# Patient Record
Sex: FEMALE | Race: ASIAN | URBAN - METROPOLITAN AREA
[De-identification: names, ages, dates, MRNs, and addresses within clinical notes are randomized per-mention and may not be internally consistent; named-entity substitution may affect disease eponyms.]

---

## 2017-06-30 ENCOUNTER — APPOINTMENT (OUTPATIENT)
Dept: CT IMAGING | Facility: CLINIC | Age: 42
End: 2017-06-30
Attending: EMERGENCY MEDICINE
Payer: COMMERCIAL

## 2017-06-30 ENCOUNTER — OFFICE VISIT (OUTPATIENT)
Dept: URGENT CARE | Facility: URGENT CARE | Age: 42
End: 2017-06-30
Payer: COMMERCIAL

## 2017-06-30 ENCOUNTER — APPOINTMENT (OUTPATIENT)
Dept: GENERAL RADIOLOGY | Facility: CLINIC | Age: 42
End: 2017-06-30
Attending: EMERGENCY MEDICINE
Payer: COMMERCIAL

## 2017-06-30 ENCOUNTER — HOSPITAL ENCOUNTER (EMERGENCY)
Facility: CLINIC | Age: 42
Discharge: HOME | End: 2017-06-30
Attending: EMERGENCY MEDICINE | Admitting: EMERGENCY MEDICINE
Payer: COMMERCIAL

## 2017-06-30 VITALS
WEIGHT: 99.6 LBS | TEMPERATURE: 101.9 F | HEART RATE: 120 BPM | DIASTOLIC BLOOD PRESSURE: 80 MMHG | SYSTOLIC BLOOD PRESSURE: 120 MMHG | BODY MASS INDEX: 20.46 KG/M2 | OXYGEN SATURATION: 98 %

## 2017-06-30 VITALS
WEIGHT: 101 LBS | HEART RATE: 94 BPM | HEIGHT: 55 IN | SYSTOLIC BLOOD PRESSURE: 118 MMHG | OXYGEN SATURATION: 99 % | DIASTOLIC BLOOD PRESSURE: 73 MMHG | TEMPERATURE: 98.9 F | BODY MASS INDEX: 23.37 KG/M2 | RESPIRATION RATE: 16 BRPM

## 2017-06-30 DIAGNOSIS — E86.0 DEHYDRATION: ICD-10-CM

## 2017-06-30 DIAGNOSIS — R50.9 FEVER, UNSPECIFIED: Primary | ICD-10-CM

## 2017-06-30 DIAGNOSIS — N12 PYELONEPHRITIS: ICD-10-CM

## 2017-06-30 DIAGNOSIS — R11.2 NAUSEA AND VOMITING, INTRACTABILITY OF VOMITING NOT SPECIFIED, UNSPECIFIED VOMITING TYPE: ICD-10-CM

## 2017-06-30 DIAGNOSIS — M54.50 ACUTE RIGHT-SIDED LOW BACK PAIN WITHOUT SCIATICA: ICD-10-CM

## 2017-06-30 DIAGNOSIS — N10 ACUTE PYELONEPHRITIS: ICD-10-CM

## 2017-06-30 LAB
ALBUMIN SERPL-MCNC: 3.5 G/DL (ref 3.4–5)
ALBUMIN UR-MCNC: 100 MG/DL
ALBUMIN UR-MCNC: >=300 MG/DL
ALP SERPL-CCNC: 101 U/L (ref 40–150)
ALT SERPL W P-5'-P-CCNC: 26 U/L (ref 0–50)
AMORPH CRY #/AREA URNS HPF: ABNORMAL /HPF
ANION GAP SERPL CALCULATED.3IONS-SCNC: 10 MMOL/L (ref 3–14)
APPEARANCE UR: ABNORMAL
APPEARANCE UR: ABNORMAL
AST SERPL W P-5'-P-CCNC: ABNORMAL U/L (ref 0–45)
BACTERIA #/AREA URNS HPF: ABNORMAL /HPF
BACTERIA #/AREA URNS HPF: ABNORMAL /HPF
BASOPHILS # BLD AUTO: 0 10E9/L (ref 0–0.2)
BASOPHILS NFR BLD AUTO: 0.1 %
BILIRUB SERPL-MCNC: 0.5 MG/DL (ref 0.2–1.3)
BILIRUB UR QL STRIP: NEGATIVE
BILIRUB UR QL STRIP: NEGATIVE
BUN SERPL-MCNC: 11 MG/DL (ref 7–30)
CALCIUM SERPL-MCNC: 9.2 MG/DL (ref 8.5–10.1)
CHLORIDE SERPL-SCNC: 96 MMOL/L (ref 94–109)
CO2 BLDCOV-SCNC: 24 MMOL/L (ref 21–28)
CO2 SERPL-SCNC: 24 MMOL/L (ref 20–32)
COLOR UR AUTO: YELLOW
COLOR UR AUTO: YELLOW
CREAT SERPL-MCNC: 0.78 MG/DL (ref 0.52–1.04)
CRP SERPL-MCNC: 236 MG/L (ref 0–8)
DIFFERENTIAL METHOD BLD: ABNORMAL
EOSINOPHIL # BLD AUTO: 0 10E9/L (ref 0–0.7)
EOSINOPHIL NFR BLD AUTO: 0 %
ERYTHROCYTE [DISTWIDTH] IN BLOOD BY AUTOMATED COUNT: 15.7 % (ref 10–15)
GFR SERPL CREATININE-BSD FRML MDRD: 81 ML/MIN/1.7M2
GLUCOSE SERPL-MCNC: 124 MG/DL (ref 70–99)
GLUCOSE UR STRIP-MCNC: NEGATIVE MG/DL
GLUCOSE UR STRIP-MCNC: NEGATIVE MG/DL
HCG UR QL: NEGATIVE
HCT VFR BLD AUTO: 39.6 % (ref 35–47)
HGB BLD-MCNC: 12.8 G/DL (ref 11.7–15.7)
HGB UR QL STRIP: ABNORMAL
HGB UR QL STRIP: ABNORMAL
KETONES UR STRIP-MCNC: >160 MG/DL
KETONES UR STRIP-MCNC: >=80 MG/DL
LACTATE BLD-SCNC: 0.9 MMOL/L (ref 0.7–2.1)
LEUKOCYTE ESTERASE UR QL STRIP: ABNORMAL
LEUKOCYTE ESTERASE UR QL STRIP: NEGATIVE
LYMPHOCYTES # BLD AUTO: 2.2 10E9/L (ref 0.8–5.3)
LYMPHOCYTES NFR BLD AUTO: 9.3 %
MCH RBC QN AUTO: 22.1 PG (ref 26.5–33)
MCHC RBC AUTO-ENTMCNC: 32.3 G/DL (ref 31.5–36.5)
MCV RBC AUTO: 68 FL (ref 78–100)
MICRO REPORT STATUS: NORMAL
MONOCYTES # BLD AUTO: 1.9 10E9/L (ref 0–1.3)
MONOCYTES NFR BLD AUTO: 7.9 %
MUCOUS THREADS #/AREA URNS LPF: PRESENT /LPF
NEUTROPHILS # BLD AUTO: 19.3 10E9/L (ref 1.6–8.3)
NEUTROPHILS NFR BLD AUTO: 82.7 %
NITRATE UR QL: POSITIVE
NITRATE UR QL: POSITIVE
NON-SQ EPI CELLS #/AREA URNS LPF: ABNORMAL /LPF
NON-SQ EPI CELLS #/AREA URNS LPF: ABNORMAL /LPF
PCO2 BLDV: 31 MM HG (ref 40–50)
PH BLDV: 7.5 PH (ref 7.32–7.43)
PH UR STRIP: 5.5 PH (ref 5–7)
PH UR STRIP: 7 PH (ref 5–7)
PLATELET # BLD AUTO: 231 10E9/L (ref 150–450)
PO2 BLDV: 72 MM HG (ref 25–47)
POTASSIUM SERPL-SCNC: 3.8 MMOL/L (ref 3.4–5.3)
PROCALCITONIN SERPL-MCNC: 1.61 NG/ML
PROT SERPL-MCNC: 9 G/DL (ref 6.8–8.8)
RBC # BLD AUTO: 5.8 10E12/L (ref 3.8–5.2)
RBC #/AREA URNS AUTO: ABNORMAL /HPF (ref 0–2)
RBC #/AREA URNS AUTO: ABNORMAL /HPF (ref 0–2)
SAO2 % BLDV FROM PO2: 96 %
SODIUM SERPL-SCNC: 130 MMOL/L (ref 133–144)
SP GR UR STRIP: 1.01 (ref 1–1.03)
SP GR UR STRIP: 1.02 (ref 1–1.03)
SPECIMEN SOURCE: NORMAL
URN SPEC COLLECT METH UR: ABNORMAL
URN SPEC COLLECT METH UR: ABNORMAL
UROBILINOGEN UR STRIP-ACNC: 0.2 EU/DL (ref 0.2–1)
UROBILINOGEN UR STRIP-ACNC: 1 EU/DL (ref 0.2–1)
WBC # BLD AUTO: 23.3 10E9/L (ref 4–11)
WBC #/AREA URNS AUTO: ABNORMAL /HPF (ref 0–2)
WBC #/AREA URNS AUTO: ABNORMAL /HPF (ref 0–2)
WET PREP SPEC: NORMAL

## 2017-06-30 PROCEDURE — 86140 C-REACTIVE PROTEIN: CPT | Performed by: EMERGENCY MEDICINE

## 2017-06-30 PROCEDURE — 87077 CULTURE AEROBIC IDENTIFY: CPT | Performed by: FAMILY MEDICINE

## 2017-06-30 PROCEDURE — 82803 BLOOD GASES ANY COMBINATION: CPT

## 2017-06-30 PROCEDURE — 74177 CT ABD & PELVIS W/CONTRAST: CPT

## 2017-06-30 PROCEDURE — 96372 THER/PROPH/DIAG INJ SC/IM: CPT | Performed by: FAMILY MEDICINE

## 2017-06-30 PROCEDURE — 87186 SC STD MICRODIL/AGAR DIL: CPT | Mod: 59 | Performed by: FAMILY MEDICINE

## 2017-06-30 PROCEDURE — 96360 HYDRATION IV INFUSION INIT: CPT | Mod: 59

## 2017-06-30 PROCEDURE — 87086 URINE CULTURE/COLONY COUNT: CPT | Performed by: FAMILY MEDICINE

## 2017-06-30 PROCEDURE — 87040 BLOOD CULTURE FOR BACTERIA: CPT | Performed by: FAMILY MEDICINE

## 2017-06-30 PROCEDURE — 83605 ASSAY OF LACTIC ACID: CPT

## 2017-06-30 PROCEDURE — 36415 COLL VENOUS BLD VENIPUNCTURE: CPT | Performed by: FAMILY MEDICINE

## 2017-06-30 PROCEDURE — 99214 OFFICE O/P EST MOD 30 MIN: CPT | Mod: 25 | Performed by: FAMILY MEDICINE

## 2017-06-30 PROCEDURE — 96361 HYDRATE IV INFUSION ADD-ON: CPT

## 2017-06-30 PROCEDURE — 87040 BLOOD CULTURE FOR BACTERIA: CPT | Performed by: EMERGENCY MEDICINE

## 2017-06-30 PROCEDURE — 84145 PROCALCITONIN (PCT): CPT | Performed by: EMERGENCY MEDICINE

## 2017-06-30 PROCEDURE — 85025 COMPLETE CBC W/AUTO DIFF WBC: CPT | Performed by: FAMILY MEDICINE

## 2017-06-30 PROCEDURE — 25000125 ZZHC RX 250: Performed by: EMERGENCY MEDICINE

## 2017-06-30 PROCEDURE — 25000128 H RX IP 250 OP 636: Performed by: EMERGENCY MEDICINE

## 2017-06-30 PROCEDURE — 71020 XR CHEST 2 VW: CPT

## 2017-06-30 PROCEDURE — 36415 COLL VENOUS BLD VENIPUNCTURE: CPT

## 2017-06-30 PROCEDURE — 81001 URINALYSIS AUTO W/SCOPE: CPT | Performed by: EMERGENCY MEDICINE

## 2017-06-30 PROCEDURE — 80053 COMPREHEN METABOLIC PANEL: CPT | Performed by: FAMILY MEDICINE

## 2017-06-30 PROCEDURE — 25000132 ZZH RX MED GY IP 250 OP 250 PS 637: Performed by: EMERGENCY MEDICINE

## 2017-06-30 PROCEDURE — 87088 URINE BACTERIA CULTURE: CPT | Performed by: FAMILY MEDICINE

## 2017-06-30 PROCEDURE — 81001 URINALYSIS AUTO W/SCOPE: CPT | Performed by: FAMILY MEDICINE

## 2017-06-30 PROCEDURE — 99285 EMERGENCY DEPT VISIT HI MDM: CPT | Mod: 25

## 2017-06-30 PROCEDURE — 25000132 ZZH RX MED GY IP 250 OP 250 PS 637

## 2017-06-30 PROCEDURE — 87210 SMEAR WET MOUNT SALINE/INK: CPT | Performed by: EMERGENCY MEDICINE

## 2017-06-30 PROCEDURE — 87491 CHLMYD TRACH DNA AMP PROBE: CPT | Performed by: EMERGENCY MEDICINE

## 2017-06-30 PROCEDURE — 87800 DETECT AGNT MULT DNA DIREC: CPT | Performed by: FAMILY MEDICINE

## 2017-06-30 RX ORDER — ONDANSETRON 4 MG/1
4 TABLET, FILM COATED ORAL ONCE
Qty: 1 TABLET | Refills: 0 | Status: SHIPPED | OUTPATIENT
Start: 2017-06-30 | End: 2017-06-30

## 2017-06-30 RX ORDER — ACETAMINOPHEN 325 MG/1
650 TABLET ORAL ONCE
Status: COMPLETED | OUTPATIENT
Start: 2017-06-30 | End: 2017-06-30

## 2017-06-30 RX ORDER — SULFAMETHOXAZOLE/TRIMETHOPRIM 800-160 MG
1 TABLET ORAL 2 TIMES DAILY
Qty: 20 TABLET | Refills: 0 | Status: SHIPPED | OUTPATIENT
Start: 2017-06-30 | End: 2017-07-02

## 2017-06-30 RX ORDER — IBUPROFEN 600 MG/1
600 TABLET, FILM COATED ORAL EVERY 6 HOURS PRN
Qty: 60 TABLET | Refills: 0 | Status: SHIPPED | OUTPATIENT
Start: 2017-06-30 | End: 2017-07-02

## 2017-06-30 RX ORDER — SODIUM CHLORIDE 9 MG/ML
1000 INJECTION, SOLUTION INTRAVENOUS CONTINUOUS
Status: DISCONTINUED | OUTPATIENT
Start: 2017-06-30 | End: 2017-07-01 | Stop reason: HOSPADM

## 2017-06-30 RX ORDER — IOPAMIDOL 755 MG/ML
51 INJECTION, SOLUTION INTRAVASCULAR ONCE
Status: COMPLETED | OUTPATIENT
Start: 2017-06-30 | End: 2017-06-30

## 2017-06-30 RX ORDER — ONDANSETRON 4 MG/1
4 TABLET, FILM COATED ORAL EVERY 8 HOURS PRN
Qty: 18 TABLET | Refills: 0 | Status: SHIPPED | OUTPATIENT
Start: 2017-06-30 | End: 2017-07-02

## 2017-06-30 RX ORDER — IBUPROFEN 600 MG/1
600 TABLET, FILM COATED ORAL ONCE
Status: COMPLETED | OUTPATIENT
Start: 2017-06-30 | End: 2017-06-30

## 2017-06-30 RX ORDER — CIPROFLOXACIN 500 MG/1
500 TABLET, FILM COATED ORAL ONCE
Status: COMPLETED | OUTPATIENT
Start: 2017-06-30 | End: 2017-06-30

## 2017-06-30 RX ORDER — CEFTRIAXONE 1 G/1
1000 INJECTION, POWDER, FOR SOLUTION INTRAMUSCULAR; INTRAVENOUS ONCE
Qty: 10 ML | Refills: 0
Start: 2017-06-30 | End: 2017-06-30

## 2017-06-30 RX ORDER — CIPROFLOXACIN 500 MG/1
500 TABLET, FILM COATED ORAL 2 TIMES DAILY
Qty: 28 TABLET | Refills: 0 | Status: ON HOLD | OUTPATIENT
Start: 2017-06-30 | End: 2017-07-03

## 2017-06-30 RX ORDER — ACETAMINOPHEN 325 MG/1
TABLET ORAL
Status: COMPLETED
Start: 2017-06-30 | End: 2017-06-30

## 2017-06-30 RX ADMIN — SODIUM CHLORIDE 1000 ML: 9 INJECTION, SOLUTION INTRAVENOUS at 20:25

## 2017-06-30 RX ADMIN — ACETAMINOPHEN 650 MG: 325 TABLET ORAL at 18:36

## 2017-06-30 RX ADMIN — IBUPROFEN 600 MG: 600 TABLET ORAL at 22:05

## 2017-06-30 RX ADMIN — IOPAMIDOL 51 ML: 755 INJECTION, SOLUTION INTRAVENOUS at 21:10

## 2017-06-30 RX ADMIN — SODIUM CHLORIDE 1000 ML: 9 INJECTION, SOLUTION INTRAVENOUS at 19:39

## 2017-06-30 RX ADMIN — SODIUM CHLORIDE 60 ML: 9 INJECTION, SOLUTION INTRAVENOUS at 21:10

## 2017-06-30 RX ADMIN — ACETAMINOPHEN 650 MG: 325 TABLET, FILM COATED ORAL at 18:36

## 2017-06-30 RX ADMIN — CIPROFLOXACIN HYDROCHLORIDE 500 MG: 500 TABLET, FILM COATED ORAL at 22:05

## 2017-06-30 ASSESSMENT — ENCOUNTER SYMPTOMS
DYSURIA: 0
WHEEZING: 0
ABDOMINAL PAIN: 0
CHILLS: 1
NAUSEA: 0
SHORTNESS OF BREATH: 0
VOMITING: 0
HEMATURIA: 0
BACK PAIN: 1
CONSTIPATION: 0
BLOOD IN STOOL: 0
FEVER: 1
PALPITATIONS: 0
DIARRHEA: 0
WOUND: 0
COUGH: 0

## 2017-06-30 NOTE — ED AVS SNAPSHOT
Emergency Department    [address] [facility] [address]    Phone:  [phone]    Fax:  [phone]                                       Antoinette Murphy   MRN: [id number]    Department:   Emergency Department   Date of Visit:  6/30/2017           After Visit Summary Signature Page     I have received my discharge instructions, and my questions have been answered. I have discussed any challenges I see with this plan with the nurse or doctor.    ..........................................................................................................................................  Patient/Patient Representative Signature      ..........................................................................................................................................  Patient Representative Print Name and Relationship to Patient    ..................................................               ................................................  Date                                            Time    ..........................................................................................................................................  Reviewed by Signature/Title    ...................................................              ..............................................  Date                                                            Time

## 2017-06-30 NOTE — PROGRESS NOTES
Chief Complaint   Patient presents with     Fever     Pt. complained she has been tired and threw up. Pt. also complains of fever     SUBJECTIVE:  Antoinette Murphy, a 42 year old female scheduled an appointment to discuss the following issues:     Fever, unspecified  Nausea and vomiting, intractability of vomiting not specified, unspecified vomiting type  Acute right-sided low back pain without sciatica  Acute pyelonephritis  Dehydration     She presents with 2 days h/o of fever , throwing up and nausea   She is currently having her periods also has associated rt sided low back pain for the same duration   Has been noticing fever and chills , has no change in his bowel habits , she has no uti symptoms   But her appetite is down from before , she has thrown up twice today and has been having chills   She has  no flank pain and the rt sided back pain does not radiate to the legs     Past Medical History:   Diagnosis Date     NO ACTIVE PROBLEMS       Past Surgical History:   Procedure Laterality Date      SECTION          Social History     Social History     Marital status:      Spouse name: N/A     Number of children: N/A     Years of education: N/A     Occupational History           Social History Main Topics     Smoking status: Never Smoker     Smokeless tobacco: Never Used     Alcohol use No     Drug use: No     Sexual activity: Yes     Partners: Male     Other Topics Concern     Not on file     Social History Narrative    In long-term relationship.    One daughter - 9 years old (as of ).    .     No formal exercise.         Current Outpatient Prescriptions   Medication Sig Dispense Refill     aspirin-acetaminophen-caffeine (EXCEDRIN MIGRAINE) 250-250-65 MG per tablet Take 1 tablet by mouth every 6 hours as needed for headaches       cefTRIAXone (ROCEPHIN) 1 GM vial Inject 1 g (1,000 mg) into the muscle once for 1 dose 10 mL 0      sulfamethoxazole-trimethoprim (BACTRIM DS/SEPTRA DS) 800-160 MG per tablet Take 1 tablet by mouth 2 times daily 20 tablet 0     ondansetron (ZOFRAN) 4 MG tablet Take 1 tablet (4 mg) by mouth every 8 hours as needed for nausea 18 tablet 0     ondansetron (ZOFRAN) 4 MG tablet Take 1 tablet (4 mg) by mouth once for 1 dose 1 tablet 0     fluocinonide (LIDEX) 0.05 % external solution Apply to AA BID x 4 weeks (Patient not taking: Reported on 6/30/2017) 60 mL 3       Health Maintenance   Topic Date Due     INFLUENZA VACCINE (SYSTEM ASSIGNED)  09/01/2017     TETANUS Q10 YR  01/01/2018     PAP Q3 YR  08/04/2019        ROS:  CONSTITUTIONAL:no fever, no chills or sweats, no excessive fatigue, no significant change in weight  CV: neg   RESP -neg  GI:  Neg   NEURO: neg   MSK -right sided low back pain    Skin - neg   Pyschiatry-neg     OBJECTIVE:  /80 (BP Location: Left arm, Patient Position: Chair, Cuff Size: Adult Regular)  Pulse 120  Temp 101.9  F (38.8  C) (Oral)  Wt 99 lb 9.6 oz (45.2 kg)  SpO2 98%  BMI 20.46 kg/m2      EXAM:  GENERAL APPEARANCE: healthy, alert and no distress  EYES: EOMI,  PERRL  HENT: ear canals and TM's normal and nose and mouth without ulcers or lesions, oral mucosa dry   RESP: lungs clear to auscultation - no rales, rhonchi or wheezes  CV: regular rates and rhythm, normal S1 S2, no S3 or S4 and no murmur, click or rub -tachycardia  ABDOMEN:  soft, nontender, no HSM or masses and bowel sounds normal  MS: no CVA tenderness noted   PSYCH: mentation appears normal and affect normal/bright        ASSESSMENT/PLAN:  Antoinette Bhatti was seen today for fever.    Diagnoses and all orders for this visit:    Fever, unspecified  -     Urine Culture Aerobic Bacterial  -     cefTRIAXone (ROCEPHIN) 1 GM vial; Inject 1 g (1,000 mg) into the muscle once for 1 dose  -     CEFTRIAXONE NA INJ /250MG  -     INJECTION INTRAMUSCULAR OR SUB-Q    Nausea and vomiting, intractability of vomiting not specified, unspecified  vomiting type  -     UA with Microscopic  -     CBC with platelets differential  -     Comprehensive metabolic panel  -     ondansetron (ZOFRAN) 4 MG tablet; Take 1 tablet (4 mg) by mouth every 8 hours as needed for nausea  -     CEFTRIAXONE NA INJ /250MG  -     INJECTION INTRAMUSCULAR OR SUB-Q    Acute right-sided low back pain without sciatica  -     UA with Microscopic  -     CBC with platelets differential  -     Comprehensive metabolic panel  -     Urine Culture Aerobic Bacterial  -     CEFTRIAXONE NA INJ /250MG  -     INJECTION INTRAMUSCULAR OR SUB-Q    Acute pyelonephritis  -     cefTRIAXone (ROCEPHIN) 1 GM vial; Inject 1 g (1,000 mg) into the muscle once for 1 dose  -     Blood culture  -     sulfamethoxazole-trimethoprim (BACTRIM DS/SEPTRA DS) 800-160 MG per tablet; Take 1 tablet by mouth 2 times daily  -     ondansetron (ZOFRAN) 4 MG tablet; Take 1 tablet (4 mg) by mouth once for 1 dose  -     CEFTRIAXONE NA INJ /250MG  -     INJECTION INTRAMUSCULAR OR SUB-Q    Dehydration        Pt was advised to follow up in ER as I was worried about possible Urosepsis also needs fluid hydration for dehydration   She was also feeling worse with worsening chills   Discussed with ER doctor about pt   Blood culture pending   Follow up if  symptoms fail to improve or worsens   Pt understood and agreed with plan             Angela Kaplan MD

## 2017-06-30 NOTE — ED AVS SNAPSHOT
Emergency Department    [address] [facility] [address]    Phone:  [phone]    Fax:  [phone]                                       Antoinette Murphy   MRN: [id number]    Department:   Emergency Department   Date of Visit:  6/30/2017           Patient Information     Date Of Birth          1975        Your diagnoses for this visit were:     Pyelonephritis        You were seen by David Junior MD.      Follow-up Information     Follow up with Milo Rowe MD In 3 days.    Specialty:  OB/Gyn    Why:  As needed    Contact information:    [facility]  [address] [id number] [phone]          Discharge Instructions         Discharge Instructions  Fever    You have been seen today for a fever. Fever is a normal body reaction to illness or inflammation. Fever is a sign that your body is doing what it should to fight something off. Fever is not dangerous, but it can make you feel miserable, and you will probably feel better if you get your fever to go down. Most infections are caused by a virus, and antibiotics will not help. Your doctor will tell you whether antibiotics are needed in your case.     At this time your doctor does not find that your fever is a sign of anything dangerous or life-threatening.  However, sometimes the signs of serious illness do not show up right away.  If you have new or worse symptoms, you may need to be seen again in the Emergency Department or by your primary doctor.      What can I do to help myself?    Fill any prescriptions the doctor gave you and take them right away--especially antibiotics.    If you have a fever, get plenty of rest and drink lots of fluids, especially water.    What clothes or blankets you have on won t change your fever. Do what is comfortable for you.    Bathing or sponging in lukewarm water may help you feel better.    Tylenol  (acetaminophen), Motrin  (ibuprofen), or Advil   "(ibuprofen) help bring fever down and may help you feel more comfortable. Be sure to read and follow the package directions, and ask your doctor if you have questions.    Do not drink alcohol.    Return to the Emergency Department if:    Any of the symptoms you have get much worse.    You seem very sick, like being too weak to get up.    You have any new symptoms, especially serious things like abdominal pain or chest pain.    You are short of breath.    You have a severe headache.    You are vomiting so much you can t keep fluids or medicines down.    You have confusion or seem unusually drowsy.    You have a seizure or convulsion.    You are not getting better after 3-5 days.    You have anything else that worries you.  Probiotics: If you have been given an antibiotic, you may want to also take a probiotic pill or eat yogurt with live cultures. Probiotics have \"good bacteria\" to help your intestines stay healthy. Studies have shown that probiotics help prevent diarrhea and other intestine problems (including C. diff infection) when you take antibiotics. You can buy these without a prescription in the pharmacy section of the store.   If your doctor has told you to follow-up at your clinic, be sure to call right away and go to your appointment.  If there is any problem with keeping your appointment, call your doctor or return to the Emergency Department.  If you were given a prescription for medicine here today, be sure to read all of the information (including the package insert) that comes with your prescription.  This will include important information about the medicine, its side effects, and any warnings that you need to know about.  The pharmacist who fills the prescription can provide more information and answer questions you may have about the medicine.  If you have questions or concerns that the pharmacist cannot address, please call or return to the Emergency Department.     Opioid Medication " Information    Pain medications are among the most commonly prescribed medicines, so we are including this information for all our patients. If you did not receive pain medication or get a prescription for pain medicine, you can ignore it.     You may have been given a prescription for an opioid (narcotic) pain medicine and/or have received a pain medicine while here in the Emergency Department. These medicines can make you drowsy or impaired. You must not drive, operate dangerous equipment, or engage in any other dangerous activities while taking these medications. If you drive while taking these medications, you could be arrested for DUI, or driving under the influence. Do not drink any alcohol while you are taking these medications.     Opioid pain medications can cause addiction. If you have a history of chemical dependency of any type, you are at a higher risk of becoming addicted to pain medications.  Only take these prescribed medications to treat your pain when all other options have been tried. Take it for as short a time and as few doses as possible. Store your pain pills in a secure place, as they are frequently stolen and provide a dangerous opportunity for children or visitors in your house to start abusing these powerful medications. We will not replace any lost or stolen medicine.  As soon as your pain is better, you should flush all your remaining medication.     Many prescription pain medications contain Tylenol  (acetaminophen), including Vicodin , Tylenol #3 , Norco , Lortab , and Percocet .  You should not take any extra pills of Tylenol  if you are using these prescription medications or you can get very sick.  Do not ever take more than 3000 mg of acetaminophen in any 24 hour period.    All opioids tend to cause constipation. Drink plenty of water and eat foods that have a lot of fiber, such as fruits, vegetables, prune juice, apple juice and high fiber cereal.  Take a laxative if you don t  move your bowels at least every other day. Miralax , Milk of Magnesia, Colace , or Senna  can be used to keep you regular.      Remember that you can always come back to the Emergency Department if you are not able to see your regular doctor in the amount of time listed above, if you get any new symptoms, or if there is anything that worries you.  Discharge Instructions  Urinary Tract Infection  You have urinary tract infection, or UTI. The urinary tract includes the kidneys (which make urine), ureters (the tubes that carry urine from the kidneys to the bladder), the bladder (which stores urine), and urethra (the tube that carries urine out of the bladder).  Urinary tract infections occur when bacteria travel up the urethra into the bladder. We suspect a UTI based on chemical and microscopic findings in your urine, but if there is a question about your findings, we will do a culture to see if bacteria grow. A urine culture takes several days. You should always follow-up with your primary physician to find out about results of your culture if one was done.   Return to the Emergency Department if:    You have severe back pain.    You are vomiting so that you can t take your medicine, or have signs of dehydration (such as urinating less than 3 times per day).    You have fever over 101.5 degrees F.    You have significant confusion or are very weak, or feel very ill.    Your child seems much more ill, won t wake up, won t respond right, or is crying for a long time and won t calm down.    Your child is showing signs of dehydration, Signs of dehydration can be:  o Your infant has had no wet diapers in 4-5 hours.  o Your older child has not passed urine in 6-8 hours.  o Your infant or child starts to have dry mouth and lips, or no saliva or tears.    Follow-up with your doctor:     Children under 24 months need to be seen by their regular doctor within one week after a diagnosis of a UTI. It may be necessary to do some  imaging tests to look at the child s kidney or bladder.    You should begin to feel better within 24 - 48 hours of starting your antibiotic.  If you do not, you need to be seen again.      Treatment:     You will be treated with an antibiotic to kill the bacteria. We have to make an educated guess as to which antibiotic will work for your infection. In most healthy people, we can guess right almost all of the time. Sometimes a culture is done to show which antibiotics will work. This usually takes 2-3 days. When the culture is done, we may have to contact you to put you on a different antibiotic.    Take a pain medication such as Tylenol  (acetaminophen), Advil  (ibuprofen), Nuprin  (ibuprofen), or Aleve  (naproxen). If you have been given a narcotic such as Vicodin  (hydrocodone with acetaminophen), Percocet  (oxycodone with acetaminophen), or codeine, do not drive for four hours after you have taken it. If the narcotic contains Tylenol  (acetaminophen), do not take Tylenol  with it. All narcotics will cause constipation, so eat a high fiber diet.      Pyridium  (phenazopyridine) or Uristat  (phenazopyridine) is a prescription medication that numbs the bladder to reduce the burning pain of some UTIs.  The same medication is available in a non-prescription version called Azo-Standard  (phenazopyridine), Urodol  (phenazopyridine), or other brand names. This medication will change the color of the urine and tears (usually blue or orange). If you wear contacts, do not wear them while taking this medication as they may be stained by the medication.    Antibiotic Warning:     If you have been placed on antibiotics - watch for signs of allergic reaction.  These include rash, lip swelling, difficulty breathing, wheezing, and dizziness.  If you develop any of these symptoms, stop the antibiotic immediately and go to an emergency room or urgent care for evaluation.    Probiotics: If you have been given an antibiotic, you may  "want to also take a probiotic pill or eat yogurt with live cultures. Probiotics have \"good bacteria\" to help your intestines stay healthy. Studies have shown that probiotics help prevent diarrhea and other intestine problems (including C. diff infection) when you take antibiotics. You can buy these without a prescription in the pharmacy section of the store.   If you were given a prescription for medicine here today, be sure to read all of the information (including the package insert) that comes with your prescription.  This will include important information about the medicine, its side effects, and any warnings that you need to know about.  The pharmacist who fills the prescription can provide more information and answer questions you may have about the medicine.  If you have questions or concerns that the pharmacist cannot address, please call or return to the Emergency Department.   Opioid Medication Information    Pain medications are among the most commonly prescribed medicines, so we are including this information for all our patients. If you did not receive pain medication or get a prescription for pain medicine, you can ignore it.     You may have been given a prescription for an opioid (narcotic) pain medicine and/or have received a pain medicine while here in the Emergency Department. These medicines can make you drowsy or impaired. You must not drive, operate dangerous equipment, or engage in any other dangerous activities while taking these medications. If you drive while taking these medications, you could be arrested for DUI, or driving under the influence. Do not drink any alcohol while you are taking these medications.     Opioid pain medications can cause addiction. If you have a history of chemical dependency of any type, you are at a higher risk of becoming addicted to pain medications.  Only take these prescribed medications to treat your pain when all other options have been tried. Take it for " as short a time and as few doses as possible. Store your pain pills in a secure place, as they are frequently stolen and provide a dangerous opportunity for children or visitors in your house to start abusing these powerful medications. We will not replace any lost or stolen medicine.  As soon as your pain is better, you should flush all your remaining medication.     Many prescription pain medications contain Tylenol  (acetaminophen), including Vicodin , Tylenol #3 , Norco , Lortab , and Percocet .  You should not take any extra pills of Tylenol  if you are using these prescription medications or you can get very sick.  Do not ever take more than 3000 mg of acetaminophen in any 24 hour period.    All opioids tend to cause constipation. Drink plenty of water and eat foods that have a lot of fiber, such as fruits, vegetables, prune juice, apple juice and high fiber cereal.  Take a laxative if you don t move your bowels at least every other day. Miralax , Milk of Magnesia, Colace , or Senna  can be used to keep you regular.      Remember that you can always come back to the Emergency Department if you are not able to see your regular doctor in the amount of time listed above, if you get any new symptoms, or if there is anything that worries you.        24 Hour Appointment Hotline       To make an appointment at any [facility], call [phone] ([phone]). If you don't have a family doctor or clinic, we will help you find one. [place] clinics are conveniently located to serve the needs of you and your family.             Review of your medicines      START taking        Dose / Directions Last dose taken    ciprofloxacin 500 MG tablet   Commonly known as:  CIPRO   Dose:  500 mg   Quantity:  28 tablet        Take 1 tablet (500 mg) by mouth 2 times daily for 14 days   Refills:  0        ibuprofen 600 MG tablet   Commonly known as:  ADVIL/MOTRIN   Dose:  600 mg   Quantity:  60 tablet        Take 1 tablet  (600 mg) by mouth every 6 hours as needed for moderate pain   Refills:  0          Our records show that you are taking the medicines listed below. If these are incorrect, please call your family doctor or clinic.        Dose / Directions Last dose taken    aspirin-acetaminophen-caffeine 250-250-65 MG per tablet   Commonly known as:  EXCEDRIN MIGRAINE   Dose:  1 tablet        Take 1 tablet by mouth every 6 hours as needed for headaches   Refills:  0        cefTRIAXone 1 GM vial   Commonly known as:  ROCEPHIN   Dose:  1000 mg   Quantity:  10 mL        Inject 1 g (1,000 mg) into the muscle once for 1 dose   Refills:  0        fluocinonide 0.05 % solution   Commonly known as:  LIDEX   Quantity:  60 mL        Apply to AA BID x 4 weeks   Refills:  3        * ondansetron 4 MG tablet   Commonly known as:  ZOFRAN   Dose:  4 mg   Quantity:  18 tablet        Take 1 tablet (4 mg) by mouth every 8 hours as needed for nausea   Refills:  0        * ondansetron 4 MG tablet   Commonly known as:  ZOFRAN   Dose:  4 mg   Quantity:  1 tablet        Take 1 tablet (4 mg) by mouth once for 1 dose   Refills:  0        sulfamethoxazole-trimethoprim 800-160 MG per tablet   Commonly known as:  BACTRIM DS/SEPTRA DS   Dose:  1 tablet   Quantity:  20 tablet        Take 1 tablet by mouth 2 times daily   Refills:  0        * Notice:  This list has 2 medication(s) that are the same as other medications prescribed for you. Read the directions carefully, and ask your doctor or other care provider to review them with you.            Prescriptions were sent or printed at these locations (2 Prescriptions)                   Other Prescriptions                Printed at Department/Unit printer (2 of 2)         ciprofloxacin (CIPRO) 500 MG tablet               ibuprofen (ADVIL/MOTRIN) 600 MG tablet                Procedures and tests performed during your visit     *UA reflex to Microscopic    Blood culture    CRP inflammation    CT Abdomen Pelvis w  Contrast    Chest XR,  PA & LAT    Chlamydia trachomatis PCR    HCG qualitative urine    ISTAT gases lactate armin POCT    NO Rho (D) immune globulin (RhoGam) needed - NOT obstetric patient    Neisseria gonorrhoea PCR    Prep for procedure - pelvic exam    Procalcitonin    Pulse oximetry nursing    Urine Microscopic    Wet prep      Orders Needing Specimen Collection     None      Pending Results     Date and Time Order Name Status Description    6/30/2017 2002 CT Abdomen Pelvis w Contrast Preliminary     6/30/2017 1923 Chlamydia trachomatis PCR In process     6/30/2017 1922 Blood culture In process     6/30/2017 1639 BLOOD CULTURE Preliminary     6/30/2017 1608 URINE CULTURE AEROBIC BACTERIAL In process             Pending Culture Results     Date and Time Order Name Status Description    6/30/2017 1923 Chlamydia trachomatis PCR In process     6/30/2017 1922 Blood culture In process     6/30/2017 1639 BLOOD CULTURE Preliminary     6/30/2017 1608 URINE CULTURE AEROBIC BACTERIAL In process             Pending Results Instructions     If you had any lab results that were not finalized at the time of your Discharge, you can call the ED Lab Result RN at [phone]. You will be contacted by this team for any positive Lab results or changes in treatment. The nurses are available 7 days a week from 10A to 6:30P.  You can leave a message 24 hours per day and they will return your call.        Test Results From Your Hospital Stay        6/30/2017  9:15 PM         6/30/2017  8:37 PM      Component Results     Component Value Ref Range & Units Status    Procalcitonin 1.61 ng/ml Final    0.50-1.99 ng/ml  Moderate risk of systemic infection.  Recommendation:   Recommend   antibiotics. Evaluate culture results and clinical features to target   antibacterial therapy. Obtain blood cultures and other relevant cultures if   not   done.   If empiric antibiotics were started, recheck PCT in: 2 days to guide   antibiotic    de-escalation.  Discontinue or de-escalate antibiotics when PCT concentration   is <80% of peak or abs PCT <0.5. If empiric antibiotics were NOT started,   recheck PCT in 6-24 hours to re-evaluate need for antibiotics.           6/30/2017  8:12 PM      Component Results     Component Value Ref Range & Units Status    CRP Inflammation 236.0 (H) 0.0 - 8.0 mg/L Final         6/30/2017  8:16 PM      Component Results     Component    Specimen Description    Vagina    Wet Prep    No Trichomonas seen  No yeast seen  No clue cells seen  WBC'S seen      Micro Report Status    FINAL 06/30/2017 6/30/2017  8:07 PM         6/30/2017  7:54 PM      Component Results     Component Value Ref Range & Units Status    HCG Qual Urine Negative NEG Final               6/30/2017  7:46 PM      Component Results     Component Value Ref Range & Units Status    Ph Venous 7.50 (H) 7.32 - 7.43 pH Final    PCO2 Venous 31 (L) 40 - 50 mm Hg Final    PO2 Venous 72 (H) 25 - 47 mm Hg Final    Bicarbonate Venous 24 21 - 28 mmol/L Final    O2 Sat Venous 96 % Final    Lactic Acid 0.9 0.7 - 2.1 mmol/L Final         6/30/2017  8:01 PM      Component Results     Component Value Ref Range & Units Status    Color Urine Yellow  Final    Appearance Urine Slightly Cloudy  Final    Glucose Urine Negative NEG mg/dL Final    Bilirubin Urine Negative NEG Final    Ketones Urine >160 (A) NEG mg/dL Final    Specific Gravity Urine 1.025 1.003 - 1.035 Final    Blood Urine Large (A) NEG Final    pH Urine 5.5 5.0 - 7.0 pH Final    Protein Albumin Urine >=300 (A) NEG mg/dL Final    Urobilinogen Urine 0.2 0.2 - 1.0 EU/dL Final    Nitrite Urine Positive (A) NEG Final    Leukocyte Esterase Urine Negative NEG Final    Source Catheterized Urine  Final         6/30/2017  8:01 PM      Component Results     Component Value Ref Range & Units Status    WBC Urine 10-25 (A) 0 - 2 /HPF Final    RBC Urine O - 2 0 - 2 /HPF Final    Squamous Epithelial /LPF Urine Moderate (A) FEW  /LPF Final    Bacteria Urine Many (A) NEG /HPF Final    Amorphous Crystals Moderate (A) NEG /HPF Final    Mucous Urine Present (A) NEG /LPF Final         6/30/2017  9:34 PM      Narrative     CT ABDOMEN AND PELVIS WITH CONTRAST   6/30/2017 9:23 PM     HISTORY: Low back pain and fever.    COMPARISON: None.    TECHNIQUE: Following the uneventful administration of 51 mL Isovue-370  intravenous contrast, helical sections were acquired from the top of  the diaphragm through the pubic symphysis. Coronal reconstructions  were generated. Radiation dose for this scan was reduced using  automated exposure control, adjustment of the mA and/or kV according  to the patient's size, or iterative reconstruction technique.    FINDINGS:     Abdomen: The liver, spleen, pancreas and adrenal glands are  unremarkable. A subcentimeter low-attenuation lesion in the inferior  pole of the right kidney, too small to characterize. Ill-defined  heterogeneous hypoenhancement scattered throughout the left kidney.  Mild left perinephric haziness. These findings likely relate to left  pyelonephritis. No dilatation of the intrarenal collecting systems or  ureters bilaterally. The gallbladder is present. No enlarged lymph  nodes or free fluid in the upper abdomen.    Scan through the lower chest is unremarkable.    Pelvis: The small and large bowel are normal in caliber. The appendix  is unremarkable. No bowel wall thickening, pneumatosis or free  intraperitoneal gas. The uterus is present. No enlarged lymph nodes or  free fluid in the abdomen or pelvis.         Impression     IMPRESSION: Left pyelonephritis.         6/30/2017  9:40 PM      Narrative     XR CHEST 2 VW   6/30/2017 9:29 PM     HISTORY: fever    COMPARISON: None.    FINDINGS: The heart is negative.  The lungs are clear. The pulmonary  vasculature is normal.  The bones and soft tissues are unremarkable.        Impression     IMPRESSION: No active infiltrates are identified.        KAY  MD MARIA ESTHER                Clinical Quality Measure: Blood Pressure Screening     Your blood pressure was checked while you were in the emergency department today. The last reading we obtained was  BP: 111/72 . Please read the guidelines below about what these numbers mean and what you should do about them.  If your systolic blood pressure (the top number) is less than 120 and your diastolic blood pressure (the bottom number) is less than 80, then your blood pressure is normal. There is nothing more that you need to do about it.  If your systolic blood pressure (the top number) is 120-139 or your diastolic blood pressure (the bottom number) is 80-89, your blood pressure may be higher than it should be. You should have your blood pressure rechecked within a year by a primary care provider.  If your systolic blood pressure (the top number) is 140 or greater or your diastolic blood pressure (the bottom number) is 90 or greater, you may have high blood pressure. High blood pressure is treatable, but if left untreated over time it can put you at risk for heart attack, stroke, or kidney failure. You should have your blood pressure rechecked by a primary care provider within the next 4 weeks.  If your provider in the emergency department today gave you specific instructions to follow-up with your doctor or provider even sooner than that, you should follow that instruction and not wait for up to 4 weeks for your follow-up visit.        Thank you for choosing [facility]       Thank you for choosing [facility] for your care. Our goal is always to provide you with excellent care. Hearing back from our patients is one way we can continue to improve our services. Please take a few minutes to complete the written survey that you may receive in the mail after you visit with us. Thank you!        [employer] Information     [employer] lets you send messages to your doctor, view your test results, renew your prescriptions, schedule appointments  "and more. To sign up, go to [email or web address] . Click on \"Log in\" on the left side of the screen, which will take you to the Welcome page. Then click on \"Sign up Now\" on the right side of the page.     You will be asked to enter the access code listed below, as well as some personal information. Please follow the directions to create your username and password.     Your access code is: [email or web address]  Expires: 2017  5:32 PM     Your access code will  in 90 days. If you need help or a new code, please call your [place] clinic or [phone].        Care EveryWhere ID     This is your Care EveryWhere ID. This could be used by other organizations to access your [place] medical records  [id number]        Equal Access to Services     DAMI GAR : Art Wynn, guicho neville, magdalene shah, obed hayes. So [facility] [phone].    ATENCIÓN: Si habla español, tiene a johnson disposición servicios gratuitos de asistencia lingüística. Llame al [phone].    We comply with applicable federal civil rights laws and [place] laws. We do not discriminate on the basis of race, color, national origin, age, disability sex, sexual orientation or gender identity.            After Visit Summary       This is your record. Keep this with you and show to your community pharmacist(s) and doctor(s) at your next visit.                  "

## 2017-06-30 NOTE — NURSING NOTE
"Chief Complaint   Patient presents with     Fever     Pt. complained she has been tired and threw up. Pt. also complains of fever       Initial /80 (BP Location: Left arm, Patient Position: Chair, Cuff Size: Adult Regular)  Pulse 120  Temp 101.9  F (38.8  C) (Oral)  Wt 99 lb 9.6 oz (45.2 kg)  SpO2 98%  BMI 20.46 kg/m2 Estimated body mass index is 20.46 kg/(m^2) as calculated from the following:    Height as of 8/4/16: 4' 10.5\" (1.486 m).    Weight as of this encounter: 99 lb 9.6 oz (45.2 kg).  Medication Reconciliation: complete    "

## 2017-06-30 NOTE — MR AVS SNAPSHOT
"              After Visit Summary   6/30/2017    Antoinette Murphy    MRN: [id number]           Patient Information     Date Of Birth          1975        Visit Information        Provider Department      6/30/2017 2:40 PM Angela Kaplan MD [facility]        Today's Diagnoses     Fever, unspecified    -  1    Nausea and vomiting, intractability of vomiting not specified, unspecified vomiting type        Acute right-sided low back pain without sciatica        Acute pyelonephritis           Follow-ups after your visit        Who to contact     If you have questions or need follow up information about today's clinic visit or your schedule please contact [facility] directly at [phone].  Normal or non-critical lab and imaging results will be communicated to you by [employer] [employer], letter or phone within 4 business days after the clinic has received the results. If you do not hear from us within 7 days, please contact the clinic through [employer] or phone. If you have a critical or abnormal lab result, we will notify you by phone as soon as possible.  Submit refill requests through [facility] or call your pharmacy and they will forward the refill request to us. Please allow 3 business days for your refill to be completed.          Additional Information About Your Visit        MyChart Information     [facility] lets you send messages to your doctor, view your test results, renew your prescriptions, schedule appointments and more. To sign up, go to [email or web address] . Click on \"Log in\" on the left side of the screen, which will take you to the Welcome page. Then click on \"Sign up Now\" on the right side of the page.     You will be asked to enter the access code listed below, as well as some personal information. Please follow the directions to create your username and password.     Your access code is: [email or web address]  Expires: 9/28/2017  5:32 PM     Your access code " will  in 90 days. If you need help or a new code, please call your [place] clinic or [phone].        Care EveryWhere ID     This is your Care EveryWhere ID. This could be used by other organizations to access your [place] medical records  [id number]        Your Vitals Were     Pulse Temperature Pulse Oximetry BMI (Body Mass Index)          120 101.9  F (38.8  C) (Oral) 98% 20.46 kg/m2         Blood Pressure from Last 3 Encounters:   17 120/80   16 130/85   16 100/60    Weight from Last 3 Encounters:   17 99 lb 9.6 oz (45.2 kg)   16 100 lb 14.4 oz (45.8 kg)   10/20/10 96 lb 1.6 oz (43.6 kg)              We Performed the Following     Blood culture     CBC with platelets differential     CEFTRIAXONE NA INJ /250MG     Comprehensive metabolic panel     INJECTION INTRAMUSCULAR OR SUB-Q     UA with Microscopic     Urine Culture Aerobic Bacterial          Today's Medication Changes          These changes are accurate as of: 17  5:32 PM.  If you have any questions, ask your nurse or doctor.               Start taking these medicines.        Dose/Directions    cefTRIAXone 1 GM vial   Commonly known as:  ROCEPHIN   Used for:  Fever, unspecified, Acute pyelonephritis   Started by:  Angela Kaplan MD        Dose:  1000 mg   Inject 1 g (1,000 mg) into the muscle once for 1 dose   Quantity:  10 mL   Refills:  0       * ondansetron 4 MG tablet   Commonly known as:  ZOFRAN   Used for:  Nausea and vomiting, intractability of vomiting not specified, unspecified vomiting type   Started by:  Angela Kaplan MD        Dose:  4 mg   Take 1 tablet (4 mg) by mouth every 8 hours as needed for nausea   Quantity:  18 tablet   Refills:  0       * ondansetron 4 MG tablet   Commonly known as:  ZOFRAN   Used for:  Acute pyelonephritis   Started by:  Angela Kaplan MD        Dose:  4 mg   Take 1 tablet (4 mg) by mouth once for 1 dose   Quantity:  1 tablet   Refills:  0        sulfamethoxazole-trimethoprim 800-160 MG per tablet   Commonly known as:  BACTRIM DS/SEPTRA DS   Used for:  Acute pyelonephritis   Started by:  Angela Kaplan MD        Dose:  1 tablet   Take 1 tablet by mouth 2 times daily   Quantity:  20 tablet   Refills:  0       * Notice:  This list has 2 medication(s) that are the same as other medications prescribed for you. Read the directions carefully, and ask your doctor or other care provider to review them with you.         Where to get your medicines      These medications were sent to [facility] [address] [address]     Phone:  [phone]     ondansetron 4 MG tablet    ondansetron 4 MG tablet    sulfamethoxazole-trimethoprim 800-160 MG per tablet         Some of these will need a paper prescription and others can be bought over the counter.  Ask your nurse if you have questions.     You don't need a prescription for these medications     cefTRIAXone 1 GM vial                Primary Care Provider Office Phone # Fax #    Milo Rowe [phone]       [facility] [address] [address]        Equal Access to Services     [facility]: Hadii dale byrd hadasho Soveronica, waaxda luqadaha, qaybta kaalmada adesilvanoyada, obed hayes. So [facility] [phone].    ATENCIÓN: Si habla español, tiene a johnson disposición servicios gratuitos de asistencia lingüística. Llame al [phone].    We comply with applicable federal civil rights laws and [place] laws. We do not discriminate on the basis of race, color, national origin, age, disability sex, sexual orientation or gender identity.            Thank you!     Thank you for choosing [facility]  for your care. Our goal is always to provide you with excellent care. Hearing back from our patients is one way we can continue to improve our services. Please  take a few minutes to complete the written survey that you may receive in the mail after your visit with us. Thank you!             Your Updated Medication List - Protect others around you: Learn how to safely use, store and throw away your medicines at www.disposemymeds.org.          This list is accurate as of: 6/30/17  5:32 PM.  Always use your most recent med list.                   Brand Name Dispense Instructions for use Diagnosis    aspirin-acetaminophen-caffeine 250-250-65 MG per tablet    EXCEDRIN MIGRAINE     Take 1 tablet by mouth every 6 hours as needed for headaches        cefTRIAXone 1 GM vial    ROCEPHIN    10 mL    Inject 1 g (1,000 mg) into the muscle once for 1 dose    Fever, unspecified, Acute pyelonephritis       fluocinonide 0.05 % solution    LIDEX    60 mL    Apply to AA BID x 4 weeks    AA (alopecia areata)       * ondansetron 4 MG tablet    ZOFRAN    18 tablet    Take 1 tablet (4 mg) by mouth every 8 hours as needed for nausea    Nausea and vomiting, intractability of vomiting not specified, unspecified vomiting type       * ondansetron 4 MG tablet    ZOFRAN    1 tablet    Take 1 tablet (4 mg) by mouth once for 1 dose    Acute pyelonephritis       sulfamethoxazole-trimethoprim 800-160 MG per tablet    BACTRIM DS/SEPTRA DS    20 tablet    Take 1 tablet by mouth 2 times daily    Acute pyelonephritis       * Notice:  This list has 2 medication(s) that are the same as other medications prescribed for you. Read the directions carefully, and ask your doctor or other care provider to review them with you.

## 2017-06-30 NOTE — NURSING NOTE
Per orders of Dr. Bud cadena, injection of Rocephin 1gm given by Mary Duque. Patient instructed to remain in clinic for 20 minutes afterwards, and to report any adverse reaction to me immediately.

## 2017-07-01 NOTE — ED PROVIDER NOTES
History   Chief Complaint:  Fever; Lower Back Pain    HPI   Patient is using an interpretor.   Antoinette Murphy is an otherwise healthy 42 year old female who presents with multiple complaint of fever, hot and cold flashes and middle lower back pain for the past 2-3 days, prompting her to visit the ED. She reports she has been taking Tylenol for the fever, last dose was yesterday. She reports her last period was today. She states the back pain is exacerbated with turning. She denies dysuria, frequency, hematuria, sore throat, cough, abdominal pain, flank pain, or chest pain. Of note, the urgent care called and talked to Dr. Junior about patient possibly having a kidney infection.     UA from earlier today at urgent care: protein albumin 100(A), nitrite positive(A), bloo large(A), leukocyte esterase trace(A), WBC 5-10(A), RBC 10-25(A), bacteria many(A)  CBC from earlier today: WBC 23.3(H), RBC 5.8(H), MCV 68(L), MCH 22.1(L), RDW 15.7(H)  CMP from earlier today: sodium 130(L), glucose 125(H) o/w WNL (creatinine 0.78)    Allergies:  zyrtec    Medications:    aspirin-acetaminophen-caffeine (EXCEDRIN MIGRAINE) 250-250-65 MG per tablet  cefTRIAXone (ROCEPHIN) 1 GM vial  sulfamethoxazole-trimethoprim (BACTRIM DS/SEPTRA DS) 800-160 MG per tablet  ondansetron (ZOFRAN) 4 MG tablet  ondansetron (ZOFRAN) 4 MG tablet  fluocinonide (LIDEX) 0.05 % external solution    Past Medical History:    The patient does not have any past pertinent medical history.    Past Surgical History:    C section    Family History:    Type 2 diabetes  Hypertension  Breast cancer    Social History:  Marital Status:   [2]  Smoking status: never  Alcohol use: no    Review of Systems   Constitutional: Positive for chills and fever.   Respiratory: Negative for cough, shortness of breath and wheezing.    Cardiovascular: Negative for chest pain, palpitations and leg swelling.   Gastrointestinal: Negative for abdominal pain, blood in stool,  "constipation, diarrhea, nausea and vomiting.   Genitourinary: Negative for dysuria, enuresis and hematuria.   Musculoskeletal: Positive for back pain (lower).   Skin: Negative for wound.   Neurological: Negative for syncope.   All other systems reviewed and are negative.    Physical Exam   Patient Vitals for the past 24 hrs:   BP Temp Temp src Pulse Heart Rate Resp SpO2 Height Weight   06/30/17 2045 111/72 - - - - - - - -   06/30/17 2030 109/72 - - - - - - - -   06/30/17 2026 110/73 98.9  F (37.2  C) Oral 94 - 16 98 % - -   06/30/17 1945 108/71 - - - 98 16 96 % - -   06/30/17 1940 115/78 - - - 104 16 96 % - -   06/30/17 1920 - 101  F (38.3  C) Oral - 110 - 97 % - -   06/30/17 1825 128/76 103.2  F (39.6  C) Oral 133 - 16 98 % 1.397 m (4' 7\") 45.8 kg (101 lb)     Physical Exam   Constitutional: She is oriented to person, place, and time. She appears well-developed.   HENT:   Head: Normocephalic and atraumatic.   Right Ear: External ear normal.   Mouth/Throat: Oropharynx is clear and moist.   Eyes: Conjunctivae and EOM are normal. Pupils are equal, round, and reactive to light.   Neck: Normal range of motion. Neck supple. No JVD present.   Cardiovascular: Normal rate, regular rhythm and normal heart sounds.    Pulmonary/Chest: Effort normal and breath sounds normal.   Abdominal: Soft. Bowel sounds are normal. She exhibits no distension. There is no tenderness. There is no rebound.   Musculoskeletal: Normal range of motion.        Back:    Lymphadenopathy:     She has no cervical adenopathy.   Neurological: She is alert and oriented to person, place, and time. She displays normal reflexes. No cranial nerve deficit. She exhibits normal muscle tone. Coordination normal.   Skin: Skin is warm and dry.   Psychiatric: She has a normal mood and affect. Her behavior is normal. Judgment normal.   Nursing note and vitals reviewed.    Emergency Department Course   Imaging:  Radiographic findings were communicated with the patient " and family who voiced understanding of the findings.  CT abdomen pelvis w contrast:  Left pyelonephritis.    As read by Radiology.    Chest XR, PA & LAT:  No active infiltrates are identified.      As read by Radiology.    Laboratory:  UA:nitrite positive(A), BLOO large(A), protein albumin >=300(A), urineketon >160(A), WBC 10-25(A), squamous epithelial moderate(A), bacteria many(A), amorphous crystals mdoerate(A) o/w WNL  CRP inflammation: 236.0(H)  Wet prep: negative  Procalcitonin: 1.61  HCG qual: Negative  Istat gases: pH venous 7.5(H), PCO2 venous 31(L), PO2 venous 72(H0 o/w WNL    Blood culture: in process  Chlamydia: in process  Gonorrhea: in process    Interventions:  1836 Tylenol 650 mg oral  1939 NS 1L IV Bolus  2025 NS 1L IV Bolus    Emergency Department Course:  Past medical records, nursing notes, and vitals reviewed.   I performed an exam of the patient and obtained history, as documented above.  UA was obtained.   IV inserted and blood drawn.  Pelvic exam was preformed.  The patient was sent for an abdominal CT scan while in the emergency department, findings above.  I rechecked the patient.  Findings and plan explained to the Patient. Patient discharged home with instructions regarding supportive care, medications, and reasons to return. The importance of close follow-up was reviewed.     Impression & Plan    Medical Decision Making:  Patient was referred from urgent care for high fever. In testing patients urine analysis only showed 5-10 white blood cells and was contaminated with blood. I am not convinced based on her clinic evaluation that she has pyelonephritis, also she did not have CVA tenderness on exam. patients history and extensive examination was performed by me. Repeat labs show an elevated CRP. Her cath UA does show 10-15 white blood cells, I suspect with this information still possible pyelonephritis. The patient is not tender and a further evaluation was undertaken by my. Pelvic exam is  negative for retained tampons. CT of the abdomen was performed to rule out retrocecal appendicitis or another cause for lower back pain and it does identify left pyelonephritis by exam. Patients  is asking why she has back pain during her periods, I think this is a chronic problem. Her fever is clearly from UTI. Her heart rate and blood pressure have been normal. I think it is safe to send her home with oral antibiotics. Cultures are pending.     Diagnosis:    ICD-10-CM   1. Left sided Pyelonephritis N12     Disposition:  Discharged to home    Discharge Medications:  New Prescriptions    CIPROFLOXACIN (CIPRO) 500 MG TABLET    Take 1 tablet (500 mg) by mouth 2 times daily for 14 days    IBUPROFEN (ADVIL/MOTRIN) 600 MG TABLET    Take 1 tablet (600 mg) by mouth every 6 hours as needed for moderate pain       Germaine Zhou  6/30/2017    EMERGENCY DEPARTMENT    I, Germaine Zhou, am serving as a scribe at 7:08 PM on 6/30/2017 to document services personally performed by David Junior MD based on my observations and the provider's statements to me.        David Junior MD  07/02/17 0646

## 2017-07-01 NOTE — DISCHARGE INSTRUCTIONS
Discharge Instructions  Fever    You have been seen today for a fever. Fever is a normal body reaction to illness or inflammation. Fever is a sign that your body is doing what it should to fight something off. Fever is not dangerous, but it can make you feel miserable, and you will probably feel better if you get your fever to go down. Most infections are caused by a virus, and antibiotics will not help. Your doctor will tell you whether antibiotics are needed in your case.     At this time your doctor does not find that your fever is a sign of anything dangerous or life-threatening.  However, sometimes the signs of serious illness do not show up right away.  If you have new or worse symptoms, you may need to be seen again in the Emergency Department or by your primary doctor.      What can I do to help myself?    Fill any prescriptions the doctor gave you and take them right away--especially antibiotics.    If you have a fever, get plenty of rest and drink lots of fluids, especially water.    What clothes or blankets you have on won t change your fever. Do what is comfortable for you.    Bathing or sponging in lukewarm water may help you feel better.    Tylenol  (acetaminophen), Motrin  (ibuprofen), or Advil  (ibuprofen) help bring fever down and may help you feel more comfortable. Be sure to read and follow the package directions, and ask your doctor if you have questions.    Do not drink alcohol.    Return to the Emergency Department if:    Any of the symptoms you have get much worse.    You seem very sick, like being too weak to get up.    You have any new symptoms, especially serious things like abdominal pain or chest pain.    You are short of breath.    You have a severe headache.    You are vomiting so much you can t keep fluids or medicines down.    You have confusion or seem unusually drowsy.    You have a seizure or convulsion.    You are not getting better after 3-5 days.    You have anything else that  "worries you.  Probiotics: If you have been given an antibiotic, you may want to also take a probiotic pill or eat yogurt with live cultures. Probiotics have \"good bacteria\" to help your intestines stay healthy. Studies have shown that probiotics help prevent diarrhea and other intestine problems (including C. diff infection) when you take antibiotics. You can buy these without a prescription in the pharmacy section of the store.   If your doctor has told you to follow-up at your clinic, be sure to call right away and go to your appointment.  If there is any problem with keeping your appointment, call your doctor or return to the Emergency Department.  If you were given a prescription for medicine here today, be sure to read all of the information (including the package insert) that comes with your prescription.  This will include important information about the medicine, its side effects, and any warnings that you need to know about.  The pharmacist who fills the prescription can provide more information and answer questions you may have about the medicine.  If you have questions or concerns that the pharmacist cannot address, please call or return to the Emergency Department.     Opioid Medication Information    Pain medications are among the most commonly prescribed medicines, so we are including this information for all our patients. If you did not receive pain medication or get a prescription for pain medicine, you can ignore it.     You may have been given a prescription for an opioid (narcotic) pain medicine and/or have received a pain medicine while here in the Emergency Department. These medicines can make you drowsy or impaired. You must not drive, operate dangerous equipment, or engage in any other dangerous activities while taking these medications. If you drive while taking these medications, you could be arrested for DUI, or driving under the influence. Do not drink any alcohol while you are taking " these medications.     Opioid pain medications can cause addiction. If you have a history of chemical dependency of any type, you are at a higher risk of becoming addicted to pain medications.  Only take these prescribed medications to treat your pain when all other options have been tried. Take it for as short a time and as few doses as possible. Store your pain pills in a secure place, as they are frequently stolen and provide a dangerous opportunity for children or visitors in your house to start abusing these powerful medications. We will not replace any lost or stolen medicine.  As soon as your pain is better, you should flush all your remaining medication.     Many prescription pain medications contain Tylenol  (acetaminophen), including Vicodin , Tylenol #3 , Norco , Lortab , and Percocet .  You should not take any extra pills of Tylenol  if you are using these prescription medications or you can get very sick.  Do not ever take more than 3000 mg of acetaminophen in any 24 hour period.    All opioids tend to cause constipation. Drink plenty of water and eat foods that have a lot of fiber, such as fruits, vegetables, prune juice, apple juice and high fiber cereal.  Take a laxative if you don t move your bowels at least every other day. Miralax , Milk of Magnesia, Colace , or Senna  can be used to keep you regular.      Remember that you can always come back to the Emergency Department if you are not able to see your regular doctor in the amount of time listed above, if you get any new symptoms, or if there is anything that worries you.  Discharge Instructions  Urinary Tract Infection  You have urinary tract infection, or UTI. The urinary tract includes the kidneys (which make urine), ureters (the tubes that carry urine from the kidneys to the bladder), the bladder (which stores urine), and urethra (the tube that carries urine out of the bladder).  Urinary tract infections occur when bacteria travel up the  urethra into the bladder. We suspect a UTI based on chemical and microscopic findings in your urine, but if there is a question about your findings, we will do a culture to see if bacteria grow. A urine culture takes several days. You should always follow-up with your primary physician to find out about results of your culture if one was done.   Return to the Emergency Department if:    You have severe back pain.    You are vomiting so that you can t take your medicine, or have signs of dehydration (such as urinating less than 3 times per day).    You have fever over 101.5 degrees F.    You have significant confusion or are very weak, or feel very ill.    Your child seems much more ill, won t wake up, won t respond right, or is crying for a long time and won t calm down.    Your child is showing signs of dehydration, Signs of dehydration can be:  o Your infant has had no wet diapers in 4-5 hours.  o Your older child has not passed urine in 6-8 hours.  o Your infant or child starts to have dry mouth and lips, or no saliva or tears.    Follow-up with your doctor:     Children under 24 months need to be seen by their regular doctor within one week after a diagnosis of a UTI. It may be necessary to do some imaging tests to look at the child s kidney or bladder.    You should begin to feel better within 24 - 48 hours of starting your antibiotic.  If you do not, you need to be seen again.      Treatment:     You will be treated with an antibiotic to kill the bacteria. We have to make an educated guess as to which antibiotic will work for your infection. In most healthy people, we can guess right almost all of the time. Sometimes a culture is done to show which antibiotics will work. This usually takes 2-3 days. When the culture is done, we may have to contact you to put you on a different antibiotic.    Take a pain medication such as Tylenol  (acetaminophen), Advil  (ibuprofen), Nuprin  (ibuprofen), or Aleve  (naproxen).  "If you have been given a narcotic such as Vicodin  (hydrocodone with acetaminophen), Percocet  (oxycodone with acetaminophen), or codeine, do not drive for four hours after you have taken it. If the narcotic contains Tylenol  (acetaminophen), do not take Tylenol  with it. All narcotics will cause constipation, so eat a high fiber diet.      Pyridium  (phenazopyridine) or Uristat  (phenazopyridine) is a prescription medication that numbs the bladder to reduce the burning pain of some UTIs.  The same medication is available in a non-prescription version called Azo-Standard  (phenazopyridine), Urodol  (phenazopyridine), or other brand names. This medication will change the color of the urine and tears (usually blue or orange). If you wear contacts, do not wear them while taking this medication as they may be stained by the medication.    Antibiotic Warning:     If you have been placed on antibiotics - watch for signs of allergic reaction.  These include rash, lip swelling, difficulty breathing, wheezing, and dizziness.  If you develop any of these symptoms, stop the antibiotic immediately and go to an emergency room or urgent care for evaluation.    Probiotics: If you have been given an antibiotic, you may want to also take a probiotic pill or eat yogurt with live cultures. Probiotics have \"good bacteria\" to help your intestines stay healthy. Studies have shown that probiotics help prevent diarrhea and other intestine problems (including C. diff infection) when you take antibiotics. You can buy these without a prescription in the pharmacy section of the store.   If you were given a prescription for medicine here today, be sure to read all of the information (including the package insert) that comes with your prescription.  This will include important information about the medicine, its side effects, and any warnings that you need to know about.  The pharmacist who fills the prescription can provide more information and " answer questions you may have about the medicine.  If you have questions or concerns that the pharmacist cannot address, please call or return to the Emergency Department.   Opioid Medication Information    Pain medications are among the most commonly prescribed medicines, so we are including this information for all our patients. If you did not receive pain medication or get a prescription for pain medicine, you can ignore it.     You may have been given a prescription for an opioid (narcotic) pain medicine and/or have received a pain medicine while here in the Emergency Department. These medicines can make you drowsy or impaired. You must not drive, operate dangerous equipment, or engage in any other dangerous activities while taking these medications. If you drive while taking these medications, you could be arrested for DUI, or driving under the influence. Do not drink any alcohol while you are taking these medications.     Opioid pain medications can cause addiction. If you have a history of chemical dependency of any type, you are at a higher risk of becoming addicted to pain medications.  Only take these prescribed medications to treat your pain when all other options have been tried. Take it for as short a time and as few doses as possible. Store your pain pills in a secure place, as they are frequently stolen and provide a dangerous opportunity for children or visitors in your house to start abusing these powerful medications. We will not replace any lost or stolen medicine.  As soon as your pain is better, you should flush all your remaining medication.     Many prescription pain medications contain Tylenol  (acetaminophen), including Vicodin , Tylenol #3 , Norco , Lortab , and Percocet .  You should not take any extra pills of Tylenol  if you are using these prescription medications or you can get very sick.  Do not ever take more than 3000 mg of acetaminophen in any 24 hour period.    All opioids tend to  cause constipation. Drink plenty of water and eat foods that have a lot of fiber, such as fruits, vegetables, prune juice, apple juice and high fiber cereal.  Take a laxative if you don t move your bowels at least every other day. Miralax , Milk of Magnesia, Colace , or Senna  can be used to keep you regular.      Remember that you can always come back to the Emergency Department if you are not able to see your regular doctor in the amount of time listed above, if you get any new symptoms, or if there is anything that worries you.

## 2017-07-02 ENCOUNTER — HOSPITAL ENCOUNTER (OUTPATIENT)
Facility: CLINIC | Age: 42
Setting detail: OBSERVATION
Discharge: HOME | End: 2017-07-03
Attending: EMERGENCY MEDICINE | Admitting: INTERNAL MEDICINE
Payer: COMMERCIAL

## 2017-07-02 DIAGNOSIS — A41.51 SEPSIS DUE TO ESCHERICHIA COLI (E. COLI) (H): ICD-10-CM

## 2017-07-02 DIAGNOSIS — G44.219 EPISODIC TENSION-TYPE HEADACHE, NOT INTRACTABLE: ICD-10-CM

## 2017-07-02 DIAGNOSIS — N10 ACUTE PYELONEPHRITIS: ICD-10-CM

## 2017-07-02 DIAGNOSIS — A49.8 INFECTION DUE TO ESBL-PRODUCING ESCHERICHIA COLI: Primary | ICD-10-CM

## 2017-07-02 DIAGNOSIS — A49.9 ESBL (EXTENDED SPECTRUM BETA-LACTAMASE) PRODUCING BACTERIA INFECTION: ICD-10-CM

## 2017-07-02 DIAGNOSIS — B96.20 E COLI BACTEREMIA: ICD-10-CM

## 2017-07-02 DIAGNOSIS — Z16.12 INFECTION DUE TO ESBL-PRODUCING ESCHERICHIA COLI: Primary | ICD-10-CM

## 2017-07-02 DIAGNOSIS — R78.81 E COLI BACTEREMIA: ICD-10-CM

## 2017-07-02 DIAGNOSIS — Z16.12 ESBL (EXTENDED SPECTRUM BETA-LACTAMASE) PRODUCING BACTERIA INFECTION: ICD-10-CM

## 2017-07-02 LAB
ANION GAP SERPL CALCULATED.3IONS-SCNC: 10 MMOL/L (ref 3–14)
BACTERIA SPEC CULT: ABNORMAL
BASOPHILS # BLD AUTO: 0 10E9/L (ref 0–0.2)
BASOPHILS NFR BLD AUTO: 0.2 %
BUN SERPL-MCNC: 7 MG/DL (ref 7–30)
C TRACH DNA SPEC QL NAA+PROBE: NORMAL
CALCIUM SERPL-MCNC: 8.7 MG/DL (ref 8.5–10.1)
CHLORIDE SERPL-SCNC: 104 MMOL/L (ref 94–109)
CO2 SERPL-SCNC: 23 MMOL/L (ref 20–32)
CREAT SERPL-MCNC: 0.58 MG/DL (ref 0.52–1.04)
DIFFERENTIAL METHOD BLD: ABNORMAL
EOSINOPHIL # BLD AUTO: 0 10E9/L (ref 0–0.7)
EOSINOPHIL NFR BLD AUTO: 0.2 %
ERYTHROCYTE [DISTWIDTH] IN BLOOD BY AUTOMATED COUNT: 14.9 % (ref 10–15)
GFR SERPL CREATININE-BSD FRML MDRD: ABNORMAL ML/MIN/1.7M2
GLUCOSE SERPL-MCNC: 124 MG/DL (ref 70–99)
HCT VFR BLD AUTO: 33.1 % (ref 35–47)
HGB BLD-MCNC: 11.1 G/DL (ref 11.7–15.7)
IMM GRANULOCYTES # BLD: 0.2 10E9/L (ref 0–0.4)
IMM GRANULOCYTES NFR BLD: 0.9 %
LYMPHOCYTES # BLD AUTO: 2.1 10E9/L (ref 0.8–5.3)
LYMPHOCYTES NFR BLD AUTO: 12.7 %
MCH RBC QN AUTO: 22.4 PG (ref 26.5–33)
MCHC RBC AUTO-ENTMCNC: 33.5 G/DL (ref 31.5–36.5)
MCV RBC AUTO: 67 FL (ref 78–100)
MICRO REPORT STATUS: ABNORMAL
MICROORGANISM SPEC CULT: ABNORMAL
MONOCYTES # BLD AUTO: 1.6 10E9/L (ref 0–1.3)
MONOCYTES NFR BLD AUTO: 9.6 %
NEUTROPHILS # BLD AUTO: 12.8 10E9/L (ref 1.6–8.3)
NEUTROPHILS NFR BLD AUTO: 76.4 %
NRBC # BLD AUTO: 0 10*3/UL
NRBC BLD AUTO-RTO: 0 /100
PLATELET # BLD AUTO: 287 10E9/L (ref 150–450)
POTASSIUM SERPL-SCNC: 3.3 MMOL/L (ref 3.4–5.3)
RBC # BLD AUTO: 4.95 10E12/L (ref 3.8–5.2)
SODIUM SERPL-SCNC: 137 MMOL/L (ref 133–144)
SPECIMEN SOURCE: ABNORMAL
SPECIMEN SOURCE: NORMAL
WBC # BLD AUTO: 16.8 10E9/L (ref 4–11)

## 2017-07-02 PROCEDURE — 36415 COLL VENOUS BLD VENIPUNCTURE: CPT

## 2017-07-02 PROCEDURE — 25000128 H RX IP 250 OP 636: Performed by: INTERNAL MEDICINE

## 2017-07-02 PROCEDURE — 85025 COMPLETE CBC W/AUTO DIFF WBC: CPT | Performed by: EMERGENCY MEDICINE

## 2017-07-02 PROCEDURE — 25000132 ZZH RX MED GY IP 250 OP 250 PS 637: Performed by: INTERNAL MEDICINE

## 2017-07-02 PROCEDURE — 80048 BASIC METABOLIC PNL TOTAL CA: CPT | Performed by: EMERGENCY MEDICINE

## 2017-07-02 PROCEDURE — 12000007 ZZH R&B INTERMEDIATE

## 2017-07-02 PROCEDURE — 99207 ZZC CDG-CODE CATEGORY CHANGED: CPT | Performed by: INTERNAL MEDICINE

## 2017-07-02 PROCEDURE — 96365 THER/PROPH/DIAG IV INF INIT: CPT

## 2017-07-02 PROCEDURE — 25000128 H RX IP 250 OP 636: Performed by: EMERGENCY MEDICINE

## 2017-07-02 PROCEDURE — 99285 EMERGENCY DEPT VISIT HI MDM: CPT | Mod: 25

## 2017-07-02 PROCEDURE — 99219 ZZC INITIAL OBSERVATION CARE,LEVL II: CPT | Performed by: INTERNAL MEDICINE

## 2017-07-02 PROCEDURE — G0378 HOSPITAL OBSERVATION PER HR: HCPCS

## 2017-07-02 PROCEDURE — 87040 BLOOD CULTURE FOR BACTERIA: CPT | Performed by: EMERGENCY MEDICINE

## 2017-07-02 RX ORDER — IBUPROFEN 600 MG/1
600 TABLET, FILM COATED ORAL EVERY 6 HOURS PRN
Status: DISCONTINUED | OUTPATIENT
Start: 2017-07-02 | End: 2017-07-03 | Stop reason: HOSPADM

## 2017-07-02 RX ORDER — AMOXICILLIN 250 MG
1-2 CAPSULE ORAL 2 TIMES DAILY
Status: DISCONTINUED | OUTPATIENT
Start: 2017-07-02 | End: 2017-07-03 | Stop reason: HOSPADM

## 2017-07-02 RX ORDER — HYDROCODONE BITARTRATE AND ACETAMINOPHEN 5; 325 MG/1; MG/1
1-2 TABLET ORAL EVERY 4 HOURS PRN
Status: DISCONTINUED | OUTPATIENT
Start: 2017-07-02 | End: 2017-07-03 | Stop reason: HOSPADM

## 2017-07-02 RX ORDER — ONDANSETRON 4 MG/1
4 TABLET, FILM COATED ORAL EVERY 8 HOURS PRN
Status: DISCONTINUED | OUTPATIENT
Start: 2017-07-02 | End: 2017-07-02

## 2017-07-02 RX ORDER — ERTAPENEM 1 G/1
1 INJECTION, POWDER, LYOPHILIZED, FOR SOLUTION INTRAMUSCULAR; INTRAVENOUS ONCE
Status: COMPLETED | OUTPATIENT
Start: 2017-07-02 | End: 2017-07-02

## 2017-07-02 RX ORDER — ERTAPENEM 1 G/1
1 INJECTION, POWDER, LYOPHILIZED, FOR SOLUTION INTRAMUSCULAR; INTRAVENOUS EVERY 24 HOURS
Status: DISCONTINUED | OUTPATIENT
Start: 2017-07-02 | End: 2017-07-03 | Stop reason: HOSPADM

## 2017-07-02 RX ORDER — ONDANSETRON 4 MG/1
4 TABLET, ORALLY DISINTEGRATING ORAL EVERY 6 HOURS PRN
Status: DISCONTINUED | OUTPATIENT
Start: 2017-07-02 | End: 2017-07-03 | Stop reason: HOSPADM

## 2017-07-02 RX ORDER — AMOXICILLIN 250 MG
1-2 CAPSULE ORAL 2 TIMES DAILY PRN
Status: DISCONTINUED | OUTPATIENT
Start: 2017-07-02 | End: 2017-07-03 | Stop reason: HOSPADM

## 2017-07-02 RX ORDER — BISACODYL 10 MG
10 SUPPOSITORY, RECTAL RECTAL DAILY PRN
Status: DISCONTINUED | OUTPATIENT
Start: 2017-07-02 | End: 2017-07-03 | Stop reason: HOSPADM

## 2017-07-02 RX ORDER — NALOXONE HYDROCHLORIDE 0.4 MG/ML
.1-.4 INJECTION, SOLUTION INTRAMUSCULAR; INTRAVENOUS; SUBCUTANEOUS
Status: DISCONTINUED | OUTPATIENT
Start: 2017-07-02 | End: 2017-07-03 | Stop reason: HOSPADM

## 2017-07-02 RX ORDER — ONDANSETRON 2 MG/ML
4 INJECTION INTRAMUSCULAR; INTRAVENOUS EVERY 6 HOURS PRN
Status: DISCONTINUED | OUTPATIENT
Start: 2017-07-02 | End: 2017-07-03 | Stop reason: HOSPADM

## 2017-07-02 RX ORDER — ACETAMINOPHEN 325 MG/1
650 TABLET ORAL EVERY 4 HOURS PRN
Status: DISCONTINUED | OUTPATIENT
Start: 2017-07-02 | End: 2017-07-03 | Stop reason: HOSPADM

## 2017-07-02 RX ADMIN — ACETAMINOPHEN 650 MG: 325 TABLET, FILM COATED ORAL at 18:02

## 2017-07-02 RX ADMIN — ERTAPENEM SODIUM 1 G: 1 INJECTION, POWDER, LYOPHILIZED, FOR SOLUTION INTRAMUSCULAR; INTRAVENOUS at 09:47

## 2017-07-02 RX ADMIN — SODIUM CHLORIDE 1000 ML: 9 INJECTION, SOLUTION INTRAVENOUS at 09:47

## 2017-07-02 RX ADMIN — ACETAMINOPHEN 650 MG: 325 TABLET, FILM COATED ORAL at 21:56

## 2017-07-02 RX ADMIN — ERTAPENEM SODIUM 1 G: 1 INJECTION, POWDER, LYOPHILIZED, FOR SOLUTION INTRAMUSCULAR; INTRAVENOUS at 13:03

## 2017-07-02 RX ADMIN — SENNOSIDES AND DOCUSATE SODIUM 1 TABLET: 8.6; 5 TABLET ORAL at 13:06

## 2017-07-02 RX ADMIN — ACETAMINOPHEN 650 MG: 325 TABLET, FILM COATED ORAL at 13:11

## 2017-07-02 NOTE — CONSULTS
Note INFECTIOUS DISEASES CONSULTATION NOTE  Covering for Sanjay Montgomery and Sin     Date 2017     Name /  Antoinette Murphy   1975     MRN [id number]       Thank you for asking us to see Antoinette Murphy for infectious diseases evaluation    REASON FOR CONSULT esbl pyelonephritis  REQUESTING PROVIDER Dr Oliver    CHIEF COMPLAINT    (+) blood culture  HPI  43 yo Setswana w ESBL (+) E coli UTI / pyelonephritis / sepsis    6.30 To ER with symptoms of UTI  Elevated WBC noted / imaging c/w pyelonephritis  Iv ceftriaxone and home on po cipro    7.2 Advised to return when BC from  (+) for ESBL (+) E coli  Main complaints today mild R sided headache and R low back and mid back pain    ROS    Some R hemicranial headache (has history of migraines ...)  No dysuria  No abdl pain  No shortness of breath  No stiff neck  No joint pains  No diarrhea        OTHER HISTORY  Past Medical History:   Diagnosis Date     Acute pyelonephritis 2017     Infection due to ESBL-producing Escherichia coli 2017     NO ACTIVE PROBLEMS      Sepsis due to Escherichia coli (E. coli) (H) 2017     Past Surgical History:   Procedure Laterality Date      SECTION        Problem (# of Occurrences) Relation (Name,Age of Onset)    Breast Cancer (1) Mother: in 50s; unsure of diagnosis; reports that it was treated and cured with medication    Hyperlipidemia (1) Mother    Hypertension (2) Mother, Father    Type 2 Diabetes (1) Brother       Negative family history of: Myocardial Infarction, CEREBROVASCULAR DISEASE, Coronary Artery Disease Early Onset        Social History     Social History     Marital status:      Spouse name: N/A     Number of children: N/A     Years of education: N/A     Occupational History           Social History Main Topics     Smoking status: Never Smoker     Smokeless tobacco: Never Used     Alcohol use No     Drug use: No     Sexual activity: Yes     Partners: Male  "    Other Topics Concern     Not on file     Social History Narrative    In long-term relationship.    One daughter - 9 years old (as of August, 2016).    .     No formal exercise.      Allergies   Allergen Reactions     Zyrtec [Cetirizine Hcl] Difficulty breathing     Breathing difficulties     Immunization History   Administered Date(s) Administered     Influenza (IIV3) 12/15/2006, 10/20/2010     TD (ADULT, 7+) 01/01/2008     Prescription Medications as of 7/2/2017             MOTRIN IB PO Take 1-2 tablets by mouth daily as needed for moderate pain    ciprofloxacin (CIPRO) 500 MG tablet Take 1 tablet (500 mg) by mouth 2 times daily for 14 days      Facility Administered Medications as of 7/2/2017             0.9% sodium chloride BOLUS Inject 1,000 mLs into the vein once    ertapenem (INVanz) 1 g vial to attach to  mL bag Inject 1 g into the vein once    aspirin-acetaminophen-caffeine (EXCEDRIN MIGRAINE) per tablet 1 tablet Take 1 tablet by mouth every 6 hours as needed for headaches    ibuprofen (ADVIL/MOTRIN) tablet 600 mg Take 1 tablet (600 mg) by mouth every 6 hours as needed for moderate pain    naloxone (NARCAN) injection 0.1-0.4 mg Inject 0.25-1 mLs (0.1-0.4 mg) into the vein every 2 minutes as needed for opioid reversal    acetaminophen (TYLENOL) tablet 650 mg Take 2 tablets (650 mg) by mouth every 4 hours as needed for mild pain    HYDROcodone-acetaminophen (NORCO) 5-325 MG per tablet 1-2 tablet Take 1-2 tablets by mouth every 4 hours as needed for moderate to severe pain    ondansetron (ZOFRAN-ODT) ODT tab 4 mg Take 1 tablet (4 mg) by mouth every 6 hours as needed for nausea or vomiting    Linked Group 1:  \"Or\" Linked Group Details     ondansetron (ZOFRAN) injection 4 mg Inject 2 mLs (4 mg) into the vein every 6 hours as needed for nausea or vomiting    Linked Group 1:  \"Or\" Linked Group Details     bisacodyl (DULCOLAX) Suppository 10 mg Place 1 suppository (10 mg) rectally daily as " "needed for constipation    magnesium hydroxide (MILK OF MAGNESIA) suspension 30 mL Take 30 mLs by mouth daily as needed for constipation    senna-docusate (SENOKOT-S;PERICOLACE) 8.6-50 MG per tablet 1-2 tablet Take 1-2 tablets by mouth 2 times daily as needed (constipation )    senna-docusate (SENOKOT-S;PERICOLACE) 8.6-50 MG per tablet 1-2 tablet Take 1-2 tablets by mouth 2 times daily    ertapenem (INVanz) 1 g vial to attach to  mL bag Inject 1 g into the vein every 24 hours    ondansetron (ZOFRAN) tablet 4 mg (Discontinued) Take 1 tablet (4 mg) by mouth every 8 hours as needed for nausea        EXAM  /61 (BP Location: Left arm)  Temp 96.9  F (36.1  C) (Oral)  Resp 16  Ht 1.397 m (4' 7\")  Wt 45.4 kg (100 lb)  SpO2 98%  BMI 23.24 kg/m2    general   In bed  no acute distress     lung   clear     back   Mild R CVA / midline lumbar tenderness     skin   unremarkable     neuro   Awake  Alert     psyche   calm, coherent, cooperative, appropriate    abd   Soft  Not tender       DATA  Cultures          LABS  Lab Test  07/02 06/30      WBC  16.8*  23.3*   ALT   --   26   BILITOTAL   --   0.5   ALKPHOS   --   101           ASSESSMENT   SUGGESTIONS    (A49.8,  Z16.12)  Infection due to ESBL-producing Escherichia coli  (primary encounter diagnosis)  (R78.81)   E coli bacteremia  (A49.9,  Z16.12)  ESBL (extended spectrum beta-lactamase) producing bacteria infection  (N10)   Acute pyelonephritis  (A41.51)  Sepsis due to Escherichia coli (E. coli) (H)    No indication of new complication of infection  Recommend 14-day course of antibiotic therapy  Potential options include  1. IV     Ertapenem  2. Oral    Cipro    TMP-S    I would favor transition to enteral therapy as initial trial (avoid added risks of longer term iv access)  Would like to see peripheral WBC normalized before discharge  Monitor R hemicranial headache (? Migraine) and low back pain (hope either due to pyelo or mechanical and not other " infection issue)     Carlitos Shelton MD  Covering for Sanjay Montgomery & [facility]  [facility] Infectious Diseases  [phone]

## 2017-07-02 NOTE — PHARMACY-ADMISSION MEDICATION HISTORY
Admission medication history interview status for the 7/2/2017  admission is complete. See EPIC admission navigator for prior to admission medications     Medication history source reliability:Good    Actions taken by pharmacist (provider contacted, etc):None     Additional medication history information not noted on PTA med list :None    Medication reconciliation/reorder completed by provider prior to medication history? No    Time spent in this activity:  10 minutes    Prior to Admission medications    Medication Sig Last Dose Taking? Auth Provider   MOTRIN IB PO Take 1-2 tablets by mouth daily as needed for moderate pain 7/2/2017 at AM Yes Unknown, Entered By History   ciprofloxacin (CIPRO) 500 MG tablet Take 1 tablet (500 mg) by mouth 2 times daily for 14 days 7/1/2017 at PM Yes David Junior MD

## 2017-07-02 NOTE — IP AVS SNAPSHOT
MRN:[id number]                      After Visit Summary   7/2/2017    Antoinette Murphy    MRN: [id number]           Thank you!     Thank you for choosing [facility] for your care. Our goal is always to provide you with excellent care. Hearing back from our patients is one way we can continue to improve our services. Please take a few minutes to complete the written survey that you may receive in the mail after you visit with us. Thank you!        Patient Information     Date Of Birth          1975        Designated Caregiver       Most Recent Value    Caregiver    Will someone help with your care after discharge? yes    Name of designated caregiver Collin     Phone number of caregiver [phone]    Caregiver address [address] [address] [facility]      About your hospital stay     You were admitted on:  July 2, 2017 You last received care in the:  [place] [address] [facility]    You were discharged on:  July 3, 2017        Reason for your hospital stay       You were admitted with ESBL UTI/pyelonephritis                  Who to Call     For medical emergencies, please call 911.  For non-urgent questions about your medical care, please call your primary care provider or clinic, [phone]          Attending Provider     Provider Specialty    Les Peraza MD Emergency Medicine    [facility], Vinh Rivera MD Internal Medicine       Primary Care Provider Office Phone # Fax #    Milo Rowe [phone]      After Care Instructions     Activity       Your activity upon discharge: activity as tolerated            Diet       Follow this diet upon discharge: Orders Placed This Encounter      Combination Diet Safe Tray - with utensils, Regular Diet Adult                  Follow-up Appointments     Follow-up and recommended labs and tests        Follow up with primary care provider, Milo Rowe MD, [id number] days for hospital follow- up.  The  "following labs/tests are recommended: repeat UA/UCx.                  Pending Results     Date and Time Order Name Status Description    2017 0911 Blood culture Preliminary     2017 0911 Blood culture Preliminary     2017 1922 Blood culture Preliminary     2017 1639 BLOOD CULTURE Preliminary             Statement of Approval     Ordered          17 0814  I have reviewed and agree with all the recommendations and orders detailed in this document.  EFFECTIVE NOW     Approved and electronically signed by:  Vinh Oliver MD             Admission Information     Date & Time Provider Department Dept. Phone    2017 Vinh Oliver MD [place] [address] [facility] [phone]      Your Vitals Were     Blood Pressure Temperature Respirations Height Weight Pulse Oximetry    131/74 (BP Location: Left arm) 100  F (37.8  C) (Oral) 17 1.397 m (4' 7\") 45.4 kg (100 lb) 97%    BMI (Body Mass Index)                   23.24 kg/m2           MyChart Information     [employer] lets you send messages to your doctor, view your test results, renew your prescriptions, schedule appointments and more. To sign up, go to [email or web address] . Click on \"Log in\" on the left side of the screen, which will take you to the Welcome page. Then click on \"Sign up Now\" on the right side of the page.     You will be asked to enter the access code listed below, as well as some personal information. Please follow the directions to create your username and password.     Your access code is: [email or web address]  Expires: 2017  5:32 PM     Your access code will  in 90 days. If you need help or a new code, please call your [facility] clinic or [phone].        Care EveryWhere ID     This is your Care EveryWhere ID. This could be used by other organizations to access your [facility] medical records  [id number]        Equal Access to Services     DAMI GAR AH: Art Wynn, guicho neville, magdalene " obed sanchez latoniajasbir fierrosilvano bourgeois ah. Galina [facility] [phone].    ATENCIÓN: Si duane robertson, tiene a johnson disposición servicios gratuitos de asistencia lingüística. Aarti al [phone].    We comply with applicable federal civil rights laws and [place] laws. We do not discriminate on the basis of race, color, national origin, age, disability sex, sexual orientation or gender identity.               Review of your medicines      START taking        Dose / Directions    HYDROcodone-acetaminophen 5-325 MG per tablet   Commonly known as:  NORCO   Used for:  Episodic tension-type headache, not intractable        Dose:  1-2 tablet   Take 1-2 tablets by mouth every 4 hours as needed for moderate to severe pain   Quantity:  20 tablet   Refills:  0       sulfamethoxazole-trimethoprim 800-160 MG per tablet   Commonly known as:  BACTRIM DS/SEPTRA DS        Dose:  1 tablet   Take 1 tablet by mouth 2 times daily for 14 days   Quantity:  28 tablet   Refills:  0         CONTINUE these medicines which have NOT CHANGED        Dose / Directions    MOTRIN IB PO        Dose:  1-2 tablet   Take 1-2 tablets by mouth daily as needed for moderate pain   Refills:  0         STOP taking     ciprofloxacin 500 MG tablet   Commonly known as:  CIPRO                Where to get your medicines      Some of these will need a paper prescription and others can be bought over the counter. Ask your nurse if you have questions.     Bring a paper prescription for each of these medications     HYDROcodone-acetaminophen 5-325 MG per tablet    sulfamethoxazole-trimethoprim 800-160 MG per tablet                Protect others around you: Learn how to safely use, store and throw away your medicines at www.disposemymeds.org.             Medication List: This is a list of all your medications and when to take them. Check marks below indicate your daily home schedule. Keep this list as a reference.      Medications           Morning  Afternoon Evening Bedtime As Needed    HYDROcodone-acetaminophen 5-325 MG per tablet   Commonly known as:  NORCO   Take 1-2 tablets by mouth every 4 hours as needed for moderate to severe pain                                MOTRIN IB PO   Take 1-2 tablets by mouth daily as needed for moderate pain                                sulfamethoxazole-trimethoprim 800-160 MG per tablet   Commonly known as:  BACTRIM DS/SEPTRA DS   Take 1 tablet by mouth 2 times daily for 14 days

## 2017-07-02 NOTE — H&P
[facility]  History and Physical   Hospitalist  Vinh Oliver       Antoinette Murphy MRN# [id number]   YOB: 1975 Age: 42 year old      Date of Admission:  2017           Primary Care Physician:   Milo Rowe [phone]         Chief Complaint:   Fever and Back pain    History is obtained from the patient         History of Present Illness:   Antoinette Murphy is a 42 year old female with no significant medical history presented 2 days ago from  with above complaints.  She was seen in our ED and noted to have elevated white count of 23K and fever.  She underwent imaging studies that showed pyelonephritis.  Patient was given ceftriaxone and discharge with ciprofloxacin.  She took 2 tablets so far.  She states her fever improved and she has some residual back pain.  She denies any fever, chills, nausea or vomiting.      Her Bcx 1/2 bottles was + for ESBL.  She was advised on coming back to our ED.  She has been given Invanz.  Her white count improved to 16K.  She is being admitted with ESBL pyelonephritis with sepsis.           Past Medical History:     Past Medical History:   Diagnosis Date     NO ACTIVE PROBLEMS                 Past Surgical History:     Past Surgical History:   Procedure Laterality Date      SECTION                 Allergies:     Allergies   Allergen Reactions     Zyrtec [Cetirizine Hcl] Difficulty breathing     Breathing difficulties            Home Medications:     Prior to Admission medications    Medication Sig Last Dose Taking? Auth Provider   MOTRIN IB PO Take 1-2 tablets by mouth daily as needed for moderate pain 2017 at AM Yes Unknown, Entered By History   ciprofloxacin (CIPRO) 500 MG tablet Take 1 tablet (500 mg) by mouth 2 times daily for 14 days 2017 at PM Yes David Junior MD              Social History:   Antoinette Murphy  reports that she has never smoked. She has never used smokeless tobacco. She  "reports that she does not drink alcohol or use illicit drugs.               Family History:     Family History   Problem Relation Age of Onset     Type 2 Diabetes Brother      Myocardial Infarction No family hx of      CEREBROVASCULAR DISEASE No family hx of      Hypertension Mother      Hypertension Father      Hyperlipidemia Mother      Coronary Artery Disease Early Onset No family hx of      Breast Cancer Mother      in 50s; unsure of diagnosis; reports that it was treated and cured with medication                 Review of Systems:   The 10 point Review of Systems is negative other than noted in the HPI or here.              Physical Exam:   Blood pressure 111/76, temperature 98.2  F (36.8  C), temperature source Oral, height 1.397 m (4' 7\"), weight 45.4 kg (100 lb), SpO2 100 %.  100 lbs 0 oz  Body mass index is 23.24 kg/(m^2).    GENERAL: The patient is a 42 year old female who is pleasant, cooperative, and in no distress.   HEENT: Unremarkable. Pupils equal. Sclerae are anicteric. Mucous membranes are moist.   NECK: Veins not distended, no carotid bruits, no JVP elevation.   LUNGS: Clear to auscultation.   CARDIOVASCULAR: S1, S2, regular rate and rhythm, no audible murmurs, gallops or rubs noted.   ABDOMEN: normal active bowel sounds, non-tender, non-distended, + CVAT  EXTREMITIES: no clubbing, cyanosis or edema  SKIN: Warm, dry, well perfused.   NEUROLOGIC: The patient is grossly nonfocal. Cranial nerves are grossly intact.  Moves all four extremities equally         Data:   All new lab and imaging data was reviewed.   Recent Labs   Lab Test  07/02/17   0929  06/30/17   1547   WBC  16.8*  23.3*   HGB  11.1*  12.8   MCV  67*  68*   PLT  287  231      Recent Labs   Lab Test  07/02/17   0929  06/30/17   1547   NA  137  130*   POTASSIUM  3.3*  3.8   CHLORIDE  104  96   CO2  23  24   BUN  7  11   CR  0.58  0.78   ANIONGAP  10  10   COMFORT  8.7  9.2   GLC  124*  124*     No lab results found.    Invalid input(s): " TROP, TROPONINIES          Assessment and Plan:   Antoinette Murphy is a 42 year old [place]ian female with no significant medical hx is being admitted with ESBL Ecoli sepsis from UTI.    1. ESBL Sepsis from pyelonephritis  - bcx re-obtained in ED  - she is clinically improved, white count down, no fever  - ID consultation  - continue Invanz  - timing per mid-line catheter placement per ID    2. DVT prophylaxis  - PCD    3. Disposition  - in 1-2 days         Code Status:   Full Code

## 2017-07-02 NOTE — ED PROVIDER NOTES
Contacted the patient at 0800 regarding a positive blood culture demonstrating ESBL.  Advised patient to return to the ED and she states understanding.     Miguel Boo MD  07/02/17 2669

## 2017-07-02 NOTE — ED PROVIDER NOTES
History     Chief Complaint:  Abnormal Labs    HPI Comments: A  was used to communicate with the patient    The history is provided by the patient. The history is limited by a language barrier.      Antoinette Murphy is a 42 year old female who presents to the ED for results of her blood cultures. Due to a language barrier, an  was used to obtain information from patient as well as communicate the results of her cultures.The patient was here on 6/30 for examination and evaluation of fever and hot and cold flashes. She had blood tests and cultures done as well as urine testing. The results of her workup showed the patient has left sided pyelonephritis and she was discharged with a prescription for Ciprofloxacin which she has been taking as prescribed. Today the patient returns to the ED because results of her blood cultures showed she has a bacterial bladder and blood infection of ESBL; she was called earlier this morning with these results and returns now to the ED as instructed. The patient does not have any symptoms other than those that were present on 6/30.     Allergies:  Cetrizine-difficulty breathing     Medications:    aspirin-acetaminophen-caffeine (EXCEDRIN MIGRAINE) 250-250-65 MG per tablet  sulfamethoxazole-trimethoprim (BACTRIM DS/SEPTRA DS) 800-160 MG per tablet  ondansetron (ZOFRAN) 4 MG tablet  ciprofloxacin (CIPRO) 500 MG tablet  fluocinonide (LIDEX) 0.05 % external solution      Past Medical History:    No significant past medical history.       Past Surgical History:    Caesarian Section    Family History:    Brother-type 2 diabetes  Mother-hypertension, hyperlipidemia, breast cancer  Father-hypertension    Social History:  Non-English speaking  Smoking status: Never smoker  Alcohol use: No  Marital Status:        Review of Systems   All other systems reviewed and are negative.      Physical Exam     Patient Vitals for the past 24 hrs:   BP Temp Temp src Heart Rate  "SpO2 Height Weight   07/02/17 0908 111/76 98.2  F (36.8  C) Oral 96 100 % 1.397 m (4' 7\") 45.4 kg (100 lb)       Physical Exam  General: woman sitting upright, male  at bedside  HENT: mucous membranes moist  CV: regular rate, regular rhythm  Resp: clear throughout, normal effort, no crackles or wheezing  GI: abdomen soft and nontender, no guarding, no palpable masses  MSK:   Thoracic spine: nontender, no CVAT  Lumbar spine: nontender  Pelvis stable.  : deferred  Skin: appropriately warm and dry, no erythema/ecchymosis/vesicles to back  Neuro: alert, clear speech, oriented, ambulatory  Psych: normal mood and affect      Emergency Department Course   Interventions:  (0947) Ertapenem 1 g, IV  (0947) Normal Saline, 1 liter, IV bolus      ED Labs:  WBC 16, Hb 11, K 3.3, Na 137, glucose 124  Repeat BCx x 2 - in process      Emergency Department Course:  Nursing notes and vitals reviewed.    I performed electronic chart review in EPIC.    (0910) I performed an exam of the patient as documented above.    Findings and plan explained to the patient and spouse via  who consents to admission.   (0946) I discussed the patient with Dr. Oliver of the hospitalist service, who will admit the patient to an OBS bed for further monitoring, evaluation, and treatment.      Impression & Plan    Medical Decision Making:  Antoinette Murphy is a pleasant generally healthy 42 year old female who returns based on her blood culture growing ESBL E. Coli. She is afebrile here with reassuring vital signs, though given this lab result in the setting of pyelonephritis from the presumed identical pathogen, I think she should be hospitalized for initiation of IV antibiotics, infectious disease consultation. This was explained to her through the . It is unclear why she may have developed this ESBL as I did not identify any risks factors for such. She has no indwelling lines or recent healthcare exposure identified. She " had a CT 2 days ago showing no kidney or ureteral stones and I did not identify an immediately surgical process. Dr. Oliver accepts care.       Diagnosis:    ICD-10-CM   1. E coli bacteremia R78.81   2. ESBL (extended spectrum beta-lactamase) producing bacteria infection A49.9    Z16.12   3. Acute pyelonephritis N10       Disposition:  Patient is admitted to an OBS bed under the care of Dr. Oliver.      Hunter Martinez  7/2/2017    EMERGENCY DEPARTMENT      I, Hunter Martinez, am serving as a scribe on 7/2/2017 at 9:10 AM to personally document services performed by Dr. Peraza based on my observations and the provider's statements to me.       Les Peraza MD  07/02/17 1055

## 2017-07-02 NOTE — ED NOTES
"[facility]  ED Nurse Handoff Report    ED Chief complaint: Abnormal Labs (pt called and told to come to ED )      ED Diagnosis:   Final diagnoses:   E coli bacteremia   ESBL (extended spectrum beta-lactamase) producing bacteria infection   Acute pyelonephritis       Code Status: Full Code    Allergies:   Allergies   Allergen Reactions     Zyrtec [Cetirizine Hcl] Difficulty breathing     Breathing difficulties       Activity level - Baseline/Home:  Independent    Activity Level - Current:   Independent     Needed?: No    Isolation: Yes  Infection: ESBL    Bariatric?: No    Vital Signs:   Vitals:    07/02/17 0908   BP: 111/76   Temp: 98.2  F (36.8  C)   TempSrc: Oral   SpO2: 100%   Weight: 45.4 kg (100 lb)   Height: 1.397 m (4' 7\")       Cardiac Rhythm: ,        Pain level:      Is this patient confused?: No    Patient Report: Initial Complaint: Abnormal labs  Focused Assessment: Patient was called in by MD for positive growth in her blood cultures. Patient tested positive for ESBL in both urine and blood. Patient is alert and oriented and denies any complaints at this time. Patient has received 1L IVF and Invanz IV.  is at bedside. Patient speaks Swedish and English.  Patient was seen in the ED on 6/29 with UTI and fever. Blood cultures were obtained at that time and patient was discharged to home.  Tests Performed: See EPIC  Abnormal Results: See EPIC  Treatments provided: See EPIC    Family Comments:  at bedside.    OBS brochure/video discussed/provided to patient: Yes    ED Medications:   Medications   ertapenem (INVanz) 1 g vial to attach to  mL bag (1 g Intravenous New Bag 7/2/17 2467)   0.9% sodium chloride BOLUS (1,000 mLs Intravenous New Bag 7/2/17 0953)       Drips infusing?:  No      ED NURSE PHONE NUMBER: [phone]         "

## 2017-07-02 NOTE — IP AVS SNAPSHOT
"[place] [address] ONCOL[facility]: [phone]                                              INTERAGENCY TRANSFER FORM - PHYSICIAN ORDERS   2017                    Hospital Admission Date: 2017  ROGER DECKER   : 1975  Sex: Female        Attending Provider: Vinh Oliver MD     Allergies:  Zyrtec [Cetirizine Hcl]    Infection:  ESBL   Service:  HOSPITALIST    Ht:  1.397 m (4' 7\")   Wt:  45.4 kg (100 lb)   Admission Wt:  45.4 kg (100 lb)    BMI:  23.24 kg/m 2   BSA:  1.33 m 2            Patient PCP Information     Provider PCP Type    Milo Rowe MD, MD General      ED Clinical Impression     Diagnosis Description Comment Added By Time Added    E coli bacteremia [R78.81] E coli bacteremia [R78.81]  Les Peraza MD 2017  9:09 AM    ESBL (extended spectrum beta-lactamase) producing bacteria infection [A49.9, Z16.12] ESBL (extended spectrum beta-lactamase) producing bacteria infection [A49.9, Z16.12]  Les Peraza MD 2017  9:09 AM    Acute pyelonephritis [N10] Acute pyelonephritis [N10]  Les Peraza MD 2017  9:09 AM      Hospital Problems as of 7/3/2017              Priority Class Noted POA    Infection due to ESBL-producing Escherichia coli High  2017 Yes    * (Principal)Sepsis due to Escherichia coli (E. coli) (H) High  2017 Yes    Acute pyelonephritis Medium  2017 Yes    Urinary tract infection due to extended-spectrum beta lactamase (ESBL)-producing Klebsiella Medium  7/3/2017 Yes      Non-Hospital Problems as of 7/3/2017     None      Code Status History     Date Active Date Inactive Code Status Order ID Comments User Context    7/3/2017  8:14 AM  Full Code [id number]  Vinh Oliver MD Outpatient    2017 11:39 AM 7/3/2017  8:14 AM Full Code [id number]  Vinh Oliver MD Inpatient         Medication Review      START taking        Dose / Directions Comments    HYDROcodone-acetaminophen 5-325 MG per tablet "   Commonly known as:  NORCO   Used for:  Episodic tension-type headache, not intractable        Dose:  1-2 tablet   Take 1-2 tablets by mouth every 4 hours as needed for moderate to severe pain   Quantity:  20 tablet   Refills:  0        sulfamethoxazole-trimethoprim 800-160 MG per tablet   Commonly known as:  BACTRIM DS/SEPTRA DS        Dose:  1 tablet   Take 1 tablet by mouth 2 times daily for 14 days   Quantity:  28 tablet   Refills:  0          CONTINUE these medications which have NOT CHANGED        Dose / Directions Comments    MOTRIN IB PO        Dose:  1-2 tablet   Take 1-2 tablets by mouth daily as needed for moderate pain   Refills:  0          STOP taking     ciprofloxacin 500 MG tablet   Commonly known as:  CIPRO                   Summary of Visit     Reason for your hospital stay       You were admitted with ESBL UTI/pyelonephritis             After Care     Activity       Your activity upon discharge: activity as tolerated       Diet       Follow this diet upon discharge: Orders Placed This Encounter      Combination Diet Safe Tray - with utensils, Regular Diet Adult             Follow-Up Appointment Instructions     Future Labs/Procedures    Follow-up and recommended labs and tests      Comments:    Follow up with primary care provider, Milo Rowe MD, [id number] days for hospital follow- up.  The following labs/tests are recommended: repeat UA/UCx.      Follow-Up Appointment Instructions     Follow-up and recommended labs and tests        Follow up with primary care provider, Milo Rowe MD, [id number] days for hospital follow- up.  The following labs/tests are recommended: repeat UA/UCx.             Statement of Approval     Ordered          07/03/17 0814  I have reviewed and agree with all the recommendations and orders detailed in this document.  EFFECTIVE NOW     Approved and electronically signed by:  Vinh Oliver MD

## 2017-07-02 NOTE — PROVIDER NOTIFICATION
MD Notification    Notified Person:  MD    Notified Persons Name: Melody     Notification Date/Time: 7/2/17 12:15    Notification Interaction:  Talked with Physician    Purpose of Notification: FYI: Potassium 3.3    Orders Received: No response.

## 2017-07-02 NOTE — PLAN OF CARE
Problem: Goal Outcome Summary  Goal: Goal Outcome Summary  Outcome: No Change  Arrived to floor at 11:30 from ED. AOx4, VSS. Up independently. C/o 7/10 pain in her back, tylenol given x1. Regular diet, tolerated well, poor appetite. No c/o nausea or vomiting. L PIV SL, abx. Pt speaks little english,  at bedside, translates for patient.  scheduled for tomorrow after 0800. Plan: 1-2 days pending progress.

## 2017-07-02 NOTE — IP AVS SNAPSHOT
[address], [address]    [facility] [address]    Phone:  [phone]                                       After Visit Summary   7/2/2017    Antoinette Murphy    MRN: [id number]           After Visit Summary Signature Page     I have received my discharge instructions, and my questions have been answered. I have discussed any challenges I see with this plan with the nurse or doctor.    ..........................................................................................................................................  Patient/Patient Representative Signature      ..........................................................................................................................................  Patient Representative Print Name and Relationship to Patient    ..................................................               ................................................  Date                                            Time    ..........................................................................................................................................  Reviewed by Signature/Title    ...................................................              ..............................................  Date                                                            Time

## 2017-07-03 VITALS
WEIGHT: 100 LBS | OXYGEN SATURATION: 97 % | DIASTOLIC BLOOD PRESSURE: 74 MMHG | HEIGHT: 55 IN | BODY MASS INDEX: 23.14 KG/M2 | RESPIRATION RATE: 17 BRPM | TEMPERATURE: 100 F | SYSTOLIC BLOOD PRESSURE: 131 MMHG

## 2017-07-03 PROBLEM — N39.0 URINARY TRACT INFECTION DUE TO EXTENDED-SPECTRUM BETA LACTAMASE (ESBL)-PRODUCING KLEBSIELLA: Status: ACTIVE | Noted: 2017-07-03

## 2017-07-03 PROBLEM — B96.89 URINARY TRACT INFECTION DUE TO EXTENDED-SPECTRUM BETA LACTAMASE (ESBL)-PRODUCING KLEBSIELLA: Status: ACTIVE | Noted: 2017-07-03

## 2017-07-03 LAB
ANION GAP SERPL CALCULATED.3IONS-SCNC: 8 MMOL/L (ref 3–14)
BUN SERPL-MCNC: 6 MG/DL (ref 7–30)
CALCIUM SERPL-MCNC: 8.5 MG/DL (ref 8.5–10.1)
CHLORIDE SERPL-SCNC: 105 MMOL/L (ref 94–109)
CO2 SERPL-SCNC: 22 MMOL/L (ref 20–32)
CREAT SERPL-MCNC: 0.57 MG/DL (ref 0.52–1.04)
ERYTHROCYTE [DISTWIDTH] IN BLOOD BY AUTOMATED COUNT: 14.9 % (ref 10–15)
GFR SERPL CREATININE-BSD FRML MDRD: ABNORMAL ML/MIN/1.7M2
GLUCOSE SERPL-MCNC: 96 MG/DL (ref 70–99)
HCT VFR BLD AUTO: 29.2 % (ref 35–47)
HGB BLD-MCNC: 9.9 G/DL (ref 11.7–15.7)
MCH RBC QN AUTO: 22.5 PG (ref 26.5–33)
MCHC RBC AUTO-ENTMCNC: 33.9 G/DL (ref 31.5–36.5)
MCV RBC AUTO: 66 FL (ref 78–100)
PLATELET # BLD AUTO: 305 10E9/L (ref 150–450)
POTASSIUM SERPL-SCNC: 3.4 MMOL/L (ref 3.4–5.3)
RBC # BLD AUTO: 4.4 10E12/L (ref 3.8–5.2)
SODIUM SERPL-SCNC: 135 MMOL/L (ref 133–144)
WBC # BLD AUTO: 15 10E9/L (ref 4–11)

## 2017-07-03 PROCEDURE — 25000132 ZZH RX MED GY IP 250 OP 250 PS 637: Performed by: INTERNAL MEDICINE

## 2017-07-03 PROCEDURE — 85027 COMPLETE CBC AUTOMATED: CPT | Performed by: INTERNAL MEDICINE

## 2017-07-03 PROCEDURE — 80048 BASIC METABOLIC PNL TOTAL CA: CPT | Performed by: INTERNAL MEDICINE

## 2017-07-03 PROCEDURE — 36415 COLL VENOUS BLD VENIPUNCTURE: CPT | Performed by: INTERNAL MEDICINE

## 2017-07-03 PROCEDURE — G0378 HOSPITAL OBSERVATION PER HR: HCPCS

## 2017-07-03 PROCEDURE — 99217 ZZC OBSERVATION CARE DISCHARGE: CPT | Performed by: INTERNAL MEDICINE

## 2017-07-03 RX ORDER — HYDROCODONE BITARTRATE AND ACETAMINOPHEN 5; 325 MG/1; MG/1
1-2 TABLET ORAL EVERY 4 HOURS PRN
Qty: 20 TABLET | Refills: 0 | Status: SHIPPED | OUTPATIENT
Start: 2017-07-03 | End: 2017-09-14

## 2017-07-03 RX ORDER — SULFAMETHOXAZOLE/TRIMETHOPRIM 800-160 MG
1 TABLET ORAL 2 TIMES DAILY
Qty: 28 TABLET | Refills: 0 | Status: SHIPPED | OUTPATIENT
Start: 2017-07-03 | End: 2017-07-17

## 2017-07-03 RX ADMIN — ACETAMINOPHEN 650 MG: 325 TABLET, FILM COATED ORAL at 08:07

## 2017-07-03 RX ADMIN — ACETAMINOPHEN 650 MG: 325 TABLET, FILM COATED ORAL at 02:26

## 2017-07-03 NOTE — PLAN OF CARE
Problem: Goal Outcome Summary  Goal: Goal Outcome Summary  Outcome: No Change  Tmax 100.1, other VSS. A&O x4. C/o discomfort in right flank. Tylenol given with improvement. Slept well. Up independently in room. On IV invanz.

## 2017-07-03 NOTE — PROGRESS NOTES
Pt dc from 88 at 10:45am to home. Discharge medications reviewed with patient. PIV removed. Pt declined wheelchair, escorted herself off unit. Will continue to monitor.

## 2017-07-03 NOTE — DISCHARGE SUMMARY
[facility]  Discharge Summary        Antoinette Murphy MRN# [id number]   YOB: 1975 Age: 42 year old     Date of Admission:  7/2/2017  Date of Discharge:  7/3/2017  Admitting Physician:  Vinh Oliver MD  Discharge Physician: Vinh Oliver MD  Discharging Service: Hospitalist     Primary Provider:  Milo Rowe  Primary Care Physician Phone Number: [phone]     Discharge Diagnoses:     ESBL Pyelonephritis/UTI    Problem Oriented Hospital Course   Antoinette Murphy was admitted on 7/2/2017 by Vinh Oliver MD and I would refer you to their history and physical.  The following problems were addressed during her hospitalization:   Antoinette Murphy is a 42 year old [place] female with no significant medical hx is being admitted with ESBL Ecoli sepsis from UTI.     1. ESBL Sepsis from pyelonephritis.    She was admitted a few days ago with low back pain and treated with ceftriaxone and discharged with ciprofloxacin.  She was asked to come back when she was found to have ESBL.  She was treated briefly on invanz.  The sensitivities came back and the ESBL was sensitive to quinolones and sulfa.  Her white count is downtrending and she is clinically improved.  She is being discharged on 14 days of septra.  She should follow up with UA/UCx after completion of treatment via her PCP office.  She has a slight bit of headache and some continued lower back pain which is tolerable. She is being discharged with norco.         Code Status:      Full Code        Brief Hospital Stay Summary Sent Home With Patient in AVS:        Reason for your hospital stay       You were admitted with ESBL UTI/pyelonephritis                      Important Results:      UCx + ESBL        Pending Results:        Unresulted Labs Ordered in the Past 30 Days of this Admission     Date and Time Order Name Status Description    7/2/2017 0911 Blood culture Preliminary     7/2/2017 0911 Blood culture  "Preliminary     6/30/2017 1922 Blood culture Preliminary     6/30/2017 1639 BLOOD CULTURE Preliminary             Discharge Instructions and Follow-Up:      Follow-up Appointments     Follow-up and recommended labs and tests        Follow up with primary care provider, Milo Rowe MD,   [id number] days for hospital follow- up.  The following labs/tests are   recommended: repeat UA/UCx.                      Discharge Disposition:      Discharged to home        Discharge Medications:        Current Discharge Medication List      START taking these medications    Details   HYDROcodone-acetaminophen (NORCO) 5-325 MG per tablet Take 1-2 tablets by mouth every 4 hours as needed for moderate to severe pain  Qty: 20 tablet, Refills: 0    Associated Diagnoses: Episodic tension-type headache, not intractable      sulfamethoxazole-trimethoprim (BACTRIM DS/SEPTRA DS) 800-160 MG per tablet Take 1 tablet by mouth 2 times daily for 14 days  Qty: 28 tablet, Refills: 0    Associated Diagnoses: Infection due to ESBL-producing Escherichia coli; Acute pyelonephritis         CONTINUE these medications which have NOT CHANGED    Details   MOTRIN IB PO Take 1-2 tablets by mouth daily as needed for moderate pain         STOP taking these medications       aspirin-acetaminophen-caffeine (EXCEDRIN MIGRAINE) 250-250-65 MG per tablet Comments:   Reason for Stopping:         ciprofloxacin (CIPRO) 500 MG tablet Comments:   Reason for Stopping:                 Allergies:         Allergies   Allergen Reactions     Zyrtec [Cetirizine Hcl] Difficulty breathing     Breathing difficulties           Consultations This Hospital Stay:      Consultation during this admission received from infectious disease        Condition and Physical on Discharge:      Discharge condition: Stable   Vitals: Blood pressure 131/74, temperature 100  F (37.8  C), temperature source Oral, resp. rate 17, height 1.397 m (4' 7\"), weight 45.4 kg (100 lb), SpO2 97 %. "     Constitutional: AXOX3   Lungs: CTA   Cardiovascular: RRR   Abdomen: soft   Skin: Warm and dry   Other:          Discharge Time:      Less than 30 minutes.        Image Results From This Hospital Stay (For Non-EPIC Providers):        Results for orders placed or performed during the hospital encounter of 06/30/17   CT Abdomen Pelvis w Contrast    Narrative    CT ABDOMEN AND PELVIS WITH CONTRAST   6/30/2017 9:23 PM     HISTORY: Low back pain and fever.    COMPARISON: None.    TECHNIQUE: Following the uneventful administration of 51 mL Isovue-370  intravenous contrast, helical sections were acquired from the top of  the diaphragm through the pubic symphysis. Coronal reconstructions  were generated. Radiation dose for this scan was reduced using  automated exposure control, adjustment of the mA and/or kV according  to the patient's size, or iterative reconstruction technique.    FINDINGS:     Abdomen: The liver, spleen, pancreas and adrenal glands are  unremarkable. A subcentimeter low-attenuation lesion in the inferior  pole of the right kidney, too small to characterize. Ill-defined  heterogeneous hypoenhancement scattered throughout the left kidney.  Mild left perinephric haziness. These findings likely relate to left  pyelonephritis. No dilatation of the intrarenal collecting systems or  ureters bilaterally. The gallbladder is present. No enlarged lymph  nodes or free fluid in the upper abdomen.    Scan through the lower chest is unremarkable.    Pelvis: The small and large bowel are normal in caliber. The appendix  is unremarkable. No bowel wall thickening, pneumatosis or free  intraperitoneal gas. The uterus is present. No enlarged lymph nodes or  free fluid in the abdomen or pelvis.       Impression    IMPRESSION: Left pyelonephritis.    MINGO PRO MD   Chest XR,  PA & LAT    Narrative    XR CHEST 2 VW   6/30/2017 9:29 PM     HISTORY: fever    COMPARISON: None.    FINDINGS: The heart is negative.  The  lungs are clear. The pulmonary  vasculature is normal.  The bones and soft tissues are unremarkable.      Impression    IMPRESSION: No active infiltrates are identified.        KYA MAYO MD           Most Recent Lab Results In EPIC (For Non-EPIC Providers):    Most Recent 3 CBC's:  Recent Labs   Lab Test  07/03/17   0735  07/02/17   0929  06/30/17   1547   WBC  15.0*  16.8*  23.3*   HGB  9.9*  11.1*  12.8   MCV  66*  67*  68*   PLT  305  287  231      Most Recent 3 BMP's:  Recent Labs   Lab Test  07/03/17   0735  07/02/17   0929  06/30/17   1547   NA  135  137  130*   POTASSIUM  3.4  3.3*  3.8   CHLORIDE  105  104  96   CO2  22  23  24   BUN  6*  7  11   CR  0.57  0.58  0.78   ANIONGAP  8  10  10   COMFORT  8.5  8.7  9.2   GLC  96  124*  124*     Most Recent 3 Troponin's:No lab results found.    Invalid input(s): TROP, TROPONINIES  Most Recent 3 INR's:No lab results found.  Most Recent 2 LFT's:  Recent Labs   Lab Test  06/30/17   1547  08/04/16   0912   AST  Unsatisfactory specimen - hemolyzed  15   ALT  26  20   ALKPHOS  101  81   BILITOTAL  0.5  0.5     Most Recent Cholesterol Panel:  Recent Labs   Lab Test  08/04/16   0912   CHOL  182   LDL  90   HDL  67   TRIG  123     Most Recent 6 Bacteria Isolates From Any Culture (See EPIC Reports for Culture Details):  Recent Labs   Lab Test  07/02/17   0934  07/02/17   0929  06/30/17   1915  06/30/17   1638  06/30/17   1607  03/09/10   1141   CULT  No growth after 18 hours  No growth after 18 hours  No growth after 3 days  Cultured on the 2nd day of incubation: Escherichia coli ESBL ESBL (extended beta   lactamase) producing organisms require contact precautions.  Critical Value/Significant Value, preliminary result only, called to and read   back by ALFREDO ALMANZA RN ([facility]).  07.02.17 0327 GJS  (Note)  POSITIVE for E. COLI by LoanLogicsigene multiplex nucleic acid test. Final  identification and antimicrobial susceptibility testing will be  verified by standard methods.    POSITIVE  for CTX-M Class A Extended Spectrum beta-lactamase (ESBL)  resistance marker by [employer]igene multiplex nucleic acid test. CTX-M  confers resistance to penicillins, cephalosporins and variable  resistance to beta-lactam beta-lactamase inhibitor combinations  (clavulanic acid and tazobactam). Best empiric antibiotic choice is  meropenem. Specific susceptibility testing will be performed.    Specimen tested with HealthStreamigene multiplex, gram-negative blood culture  nucleic acid test for the following targets: Acinetobacter sp.,  Citrobacter sp.,  Enterobacter sp., Proteus sp., E. coli, K.  pneumoniae/oxytoca, P. aeruginosa, and the following resistance  markers: CTXM, KPC, NDM, VIM, IMP and OXA.    Critical Value/Significant Value called to and read back by Zuleyka Ross RN 0538 NIKKO 07.02.17 CF      *  >100,000 colonies/mL Escherichia coli ESBL ESBL (extended beta lactamase)   producing organisms require contact precautions.  *  >100,000 colonies/mL Escherichia coli     Most Recent TSH, T4 and HgbA1c: No lab results found.

## 2017-07-03 NOTE — PLAN OF CARE
Problem: Goal Outcome Summary  Goal: Goal Outcome Summary  Outcome: Improving  A/ox x4. Contact ESBL. VSS. Regular diet.  brought in food tonight. Good appetite. +BM. Pain managed with PRN tylenol. No N/V.  for tomorrow scheduled.

## 2017-07-03 NOTE — PROGRESS NOTES
"[facility]  Infectious Disease Progress Note          Assessment and Plan:   IMp 1 Healthy 43 yo female acute sepsis, bacteremic ESBL E coli UTI/pyelo, clinically improving on antibiotics  2 R HA, ?    REc  1 erta while here, but enteral tx OK any time as fortunately sens both quinolones and sulfa, I would favor 2 weeks septra DS bid when otherwise disposition ready  2 HA not logically directly related        Interval History:   doing well HA biggest co; no cp, sob, n/v/d, or abd pain. T down, WBc 16 K 7/2, some flank pain, Ok po intake  Reviewed all              Medications:       senna-docusate  1-2 tablet Oral BID     ertapenem (INVanz) IV  1 g Intravenous Q24H                  Physical Exam:   Blood pressure 105/61, temperature 100.1  F (37.8  C), temperature source Oral, resp. rate 16, height 1.397 m (4' 7\"), weight 45.4 kg (100 lb), SpO2 97 %.  Wt Readings from Last 2 Encounters:   07/02/17 45.4 kg (100 lb)   06/30/17 45.8 kg (101 lb)     Vital Signs with Ranges  Temp:  [96.9  F (36.1  C)-100.1  F (37.8  C)] 100.1  F (37.8  C)  Heart Rate:  [84-96] 85  Resp:  [16] 16  BP: (105-116)/(61-76) 105/61  SpO2:  [97 %-100 %] 97 %    Constitutional: Awake, alert, cooperative, no apparent distress neuro Ok   Lungs: Clear to auscultation bilaterally, no crackles or wheezing   Cardiovascular: Regular rate and rhythm, normal S1 and S2, and no murmur noted   Abdomen: Normal bowel sounds, soft, non-distended, non-tender Sl flank tender   Skin: No rashes, no cyanosis, no edema   Other:           Data:   All microbiology laboratory data reviewed.  Recent Labs   Lab Test  07/02/17   0929  06/30/17   1547  08/04/16   0912   WBC  16.8*  23.3*  8.1   HGB  11.1*  12.8  12.1   HCT  33.1*  39.6  38.2   MCV  67*  68*  71*   PLT  287  231  329     Recent Labs   Lab Test  07/02/17   0929  06/30/17   1547  08/04/16   0912   CR  0.58  0.78  0.62     No lab results found.  Recent Labs   Lab Test  07/02/17   0934  " 07/02/17   0929  06/30/17   1915  06/30/17   1638  06/30/17   1607  03/09/10   1141   CULT  No growth after 18 hours  No growth after 18 hours  No growth after 3 days  Cultured on the 2nd day of incubation: Escherichia coli ESBL ESBL (extended beta   lactamase) producing organisms require contact precautions.  Critical Value/Significant Value, preliminary result only, called to and read   back by ALFREDO ALMANZA RN ([facility]).  07.02.17 0327 GJS  (Note)  POSITIVE for E. COLI by Verigene multiplex nucleic acid test. Final  identification and antimicrobial susceptibility testing will be  verified by standard methods.    POSITIVE for CTX-M Class A Extended Spectrum beta-lactamase (ESBL)  resistance marker by Verigene multiplex nucleic acid test. CTX-M  confers resistance to penicillins, cephalosporins and variable  resistance to beta-lactam beta-lactamase inhibitor combinations  (clavulanic acid and tazobactam). Best empiric antibiotic choice is  meropenem. Specific susceptibility testing will be performed.    Specimen tested with Fublesigene multiplex, gram-negative blood culture  nucleic acid test for the following targets: Acinetobacter sp.,  Citrobacter sp.,  Enterobacter sp., Proteus sp., E. coli, K.  pneumoniae/oxytoca, P. aeruginosa, and the following resistance  markers: CTXM, KPC, NDM, VIM, IMP and OXA.    Critical Value/Significant Value called to and read back by Alfredo Ross RN 0538 [facility] 07.02.17 CF      *  >100,000 colonies/mL Escherichia coli ESBL ESBL (extended beta lactamase)   producing organisms require contact precautions.  *  >100,000 colonies/mL Escherichia coli

## 2017-07-04 LAB
BACTERIA SPEC CULT: ABNORMAL
Lab: ABNORMAL
MICRO REPORT STATUS: ABNORMAL
MICROORGANISM SPEC CULT: ABNORMAL
SPECIMEN SOURCE: ABNORMAL

## 2017-07-05 ENCOUNTER — TELEPHONE (OUTPATIENT)
Dept: INTERNAL MEDICINE | Facility: CLINIC | Age: 42
End: 2017-07-05

## 2017-07-05 NOTE — TELEPHONE ENCOUNTER
"Used [facility]     Hospital/TCU/ED for chronic condition Discharge Protocol    \"Hi, my name is Malinda Cote, a registered nurse, and I am calling from [facility] ([facility]).  I am calling to follow up and see how things are going for you after your recent emergency visit/hospital/TCU stay.\"    Tell me how you are doing now that you are home?\" Patient stated, \"I'm doing OK.\"        Discharge Instructions    \"Let's review your discharge instructions.  What is/are the follow-up recommendations?  Pt. Response: Follow up with primary care doctor     \"Has an appointment with your primary care provider been scheduled?\"   No (schedule appointment)  Follow up appointment made on 7/11/17    \"When you see the provider, I would recommend that you bring your medications with you.\"    Medications    \"Tell me what changed about your medicines when you discharged?\"    Changes to chronic meds?    0-1    \"What questions do you have about your medications?\"    None     New diagnoses of heart failure, COPD, diabetes, or MI?    No              Medication reconciliation completed? Yes  Was MTM referral placed (*Make sure to put transitions as reason for referral)?   No    Call Summary    \"What questions or concerns do you have about your recent visit and your follow-up care?\"     none    \"If you have questions or things don't continue to improve, we encourage you contact us through the main clinic number (give number).  Even if the clinic is not open, triage nurses are available 24/7 to help you.     We would like you to know that our clinic has extended hours (provide information).  We also have urgent care (provide details on closest location and hours/contact info)\"      \"Thank you for your time and take care!\"             "

## 2017-07-06 LAB
BACTERIA SPEC CULT: NO GROWTH
Lab: NORMAL
MICRO REPORT STATUS: NORMAL
SPECIMEN SOURCE: NORMAL

## 2017-07-08 LAB
BACTERIA SPEC CULT: NO GROWTH
BACTERIA SPEC CULT: NO GROWTH
Lab: NORMAL
Lab: NORMAL
MICRO REPORT STATUS: NORMAL
MICRO REPORT STATUS: NORMAL
SPECIMEN SOURCE: NORMAL
SPECIMEN SOURCE: NORMAL

## 2017-07-11 ENCOUNTER — OFFICE VISIT (OUTPATIENT)
Dept: INTERNAL MEDICINE | Facility: CLINIC | Age: 42
End: 2017-07-11
Payer: COMMERCIAL

## 2017-07-11 VITALS
HEART RATE: 87 BPM | HEIGHT: 55 IN | DIASTOLIC BLOOD PRESSURE: 68 MMHG | BODY MASS INDEX: 23.42 KG/M2 | OXYGEN SATURATION: 99 % | WEIGHT: 101.2 LBS | TEMPERATURE: 97.8 F | SYSTOLIC BLOOD PRESSURE: 102 MMHG

## 2017-07-11 DIAGNOSIS — Z09 HOSPITAL DISCHARGE FOLLOW-UP: Primary | ICD-10-CM

## 2017-07-11 DIAGNOSIS — A49.8 INFECTION DUE TO ESBL-PRODUCING ESCHERICHIA COLI: ICD-10-CM

## 2017-07-11 DIAGNOSIS — A41.51 SEPSIS DUE TO ESCHERICHIA COLI (H): ICD-10-CM

## 2017-07-11 DIAGNOSIS — Z16.12 INFECTION DUE TO ESBL-PRODUCING ESCHERICHIA COLI: ICD-10-CM

## 2017-07-11 PROCEDURE — 99495 TRANSJ CARE MGMT MOD F2F 14D: CPT | Performed by: INTERNAL MEDICINE

## 2017-07-11 NOTE — NURSING NOTE
"Chief Complaint   Patient presents with     Hospital F/U       Initial /68  Pulse 87  Temp 97.8  F (36.6  C) (Oral)  Ht 4' 7\" (1.397 m)  Wt 101 lb 3.2 oz (45.9 kg)  LMP 06/29/2017  SpO2 99%  BMI 23.52 kg/m2 Estimated body mass index is 23.52 kg/(m^2) as calculated from the following:    Height as of this encounter: 4' 7\" (1.397 m).    Weight as of this encounter: 101 lb 3.2 oz (45.9 kg).  Medication Reconciliation: complete   Rosmery Lomeli MA   "

## 2017-07-11 NOTE — PROGRESS NOTES
"SUBJECTIVE:      Antoinette Murphy is a pleasant 42 year old female who presents for hospital follow-up:    [place]  utilized.    Hospital: [facility]  Date of Admission: 7/2/17  Date of Discharge: 7/3/17  Reason(s) for Admission: ESBL E.Coli sepsis    Diagnostic tests, treatments/interventions, and discharge summary reviewed.    Summary of hospitalization:  - first seen in urgent care on 6/30/17 with fevers, chills, nausea, vomiting, and lower back pain  - exam notable for fever to 101.9, tachycardia to 120, and dry oral mucosa  - no urinary symptoms but UA suggestive of UTI  - labs significant for leukocytosis to 23K, with a neutrophil predominance  - patient sent to ER for further evaluation and IV fluids  - CT demonstrated left pyelonephritis  - given ceftriaxone in the ER and discharged on a course of cipro  - urine culture subsequently came back with ESBL producing E. Coli    - patient asked to return to hospital to be admitted  - briefly treated with Invanz until sensitivities returned    - susceptible to quinolones and sulfa    - discharged on 14 day course of Bactrim     Medication changes since discharge: none  Adherent to discharge medications: yes  Problems taking discharge medications: no    Follow-up: n/a     Update since discharge:   - overall, feeling better  - no fevers or chills  - no nausea, vomiting, or diarrhea  - no abdominal, pelvic, or flank pain  - still with mild, midline lower back discomfort  - sometimes fatigued but better than before      OBJECTIVE:       /68  Pulse 87  Temp 97.8  F (36.6  C) (Oral)  Ht 4' 7\" (1.397 m)  Wt 101 lb 3.2 oz (45.9 kg)  LMP 06/29/2017  SpO2 99%  BMI 23.52 kg/m2  Constitutional: well-appearing  Respiratory: normal respiratory effort; clear to auscultation bilaterally  Cardiovascular: regular rate and rhythm; no edema  Gastrointestinal: soft, non-tender, non-distended, and bowel sounds present; no flank tenderness to palpation "   Musculoskeletal: normal gait and station  Psych: normal judgment and insight; normal mood and affect; recent and remote memory intact    ASSESSMENT/PLAN:       (Z09) Hospital discharge follow-up  (primary encounter diagnosis)  (A49.8,  Z16.12) Infection due to ESBL-producing Escherichia coli  (A41.51) Sepsis due to Escherichia coli (H)  Comment: overall, doing well.   Plan:    - continue and complete 14 day course of Bactrim.   - follow-up urine culture after completing Bactrim (ordered, lab visit only).    The instructions on the AVS were discussed and explained to the patient. Patient expressed understanding of instructions.    Sofiya Ulloa MD   [address] [address]  T: [phone], F: [phone]

## 2017-07-11 NOTE — PATIENT INSTRUCTIONS
Continue and complete antibiotics.    ---    Plan for urine culture after completing antibiotics.     ---    E. Coli

## 2017-07-11 NOTE — MR AVS SNAPSHOT
"              After Visit Summary   2017    Antoinette Murphy    MRN: [id number]           Patient Information     Date Of Birth          1975        Visit Information        Provider Department      2017 9:15 AM Sofiya Ulloa MD; LANGUAGE [facility]        Care Instructions    Continue and complete antibiotics.    ---    Plan for urine culture after completing antibiotics.     ---    E. Coli          Follow-ups after your visit        Who to contact     If you have questions or need follow up information about today's clinic visit or your schedule please contact [facility] directly at [phone].  Normal or non-critical lab and imaging results will be communicated to you by MyChart, letter or phone within 4 business days after the clinic has received the results. If you do not hear from us within 7 days, please contact the clinic through MyChart or phone. If you have a critical or abnormal lab result, we will notify you by phone as soon as possible.  Submit refill requests through [employer] or call your pharmacy and they will forward the refill request to us. Please allow 3 business days for your refill to be completed.          Additional Information About Your Visit        MyChart Information     [employer] lets you send messages to your doctor, view your test results, renew your prescriptions, schedule appointments and more. To sign up, go to [email or web address] . Click on \"Log in\" on the left side of the screen, which will take you to the Welcome page. Then click on \"Sign up Now\" on the right side of the page.     You will be asked to enter the access code listed below, as well as some personal information. Please follow the directions to create your username and password.     Your access code is: [email or web address]  Expires: 2017  5:32 PM     Your access code will  in 90 days. If you need help or a new code, please call your " "[facility] or [phone].        Care EveryWhere ID     This is your Care EveryWhere ID. This could be used by other organizations to access your [place] medical records  [id number]        Your Vitals Were     Pulse Temperature Height Last Period Pulse Oximetry BMI (Body Mass Index)    87 97.8  F (36.6  C) (Oral) 4' 7\" (1.397 m) 06/29/2017 99% 23.52 kg/m2       Blood Pressure from Last 3 Encounters:   07/11/17 102/68   07/03/17 131/74   06/30/17 118/73    Weight from Last 3 Encounters:   07/11/17 101 lb 3.2 oz (45.9 kg)   07/02/17 100 lb (45.4 kg)   06/30/17 101 lb (45.8 kg)              Today, you had the following     No orders found for display       Primary Care Provider Office Phone # Fax #    Milo Janice Rowe [phone]       [facility] [address] [address]        Equal Access to Services     [facility]: Hadii aad ku hadasho Soomaali, waaxda luqadaha, qaybta kaalmada adesilvanoyalorenzo, obed bourgeois . So [facility] [phone].    ATENCIÓN: Si habla español, tiene a johnson disposición servicios gratuitos de asistencia lingüística. Llame al [phone].    We comply with applicable federal civil rights laws and [place] laws. We do not discriminate on the basis of race, color, national origin, age, disability sex, sexual orientation or gender identity.            Thank you!     Thank you for choosing [facility]  for your care. Our goal is always to provide you with excellent care. Hearing back from our patients is one way we can continue to improve our services. Please take a few minutes to complete the written survey that you may receive in the mail after your visit with us. Thank you!             Your Updated Medication List - Protect others around you: Learn how to safely use, store and throw away your medicines at www.disposemymeds.org.          This list is accurate as of: 7/11/17 10:09 AM.  Always " use your most recent med list.                   Brand Name Dispense Instructions for use Diagnosis    HYDROcodone-acetaminophen 5-325 MG per tablet    NORCO    20 tablet    Take 1-2 tablets by mouth every 4 hours as needed for moderate to severe pain    Episodic tension-type headache, not intractable       MOTRIN IB PO      Take 1-2 tablets by mouth daily as needed for moderate pain        sulfamethoxazole-trimethoprim 800-160 MG per tablet    BACTRIM DS/SEPTRA DS    28 tablet    Take 1 tablet by mouth 2 times daily for 14 days    Infection due to ESBL-producing Escherichia coli, Acute pyelonephritis

## 2017-07-19 DIAGNOSIS — Z09 HOSPITAL DISCHARGE FOLLOW-UP: ICD-10-CM

## 2017-07-19 DIAGNOSIS — A49.8 INFECTION DUE TO ESBL-PRODUCING ESCHERICHIA COLI: ICD-10-CM

## 2017-07-19 DIAGNOSIS — A41.51 SEPSIS DUE TO ESCHERICHIA COLI (H): ICD-10-CM

## 2017-07-19 DIAGNOSIS — Z16.12 INFECTION DUE TO ESBL-PRODUCING ESCHERICHIA COLI: ICD-10-CM

## 2017-07-19 PROCEDURE — 87086 URINE CULTURE/COLONY COUNT: CPT | Performed by: INTERNAL MEDICINE

## 2017-07-20 LAB
BACTERIA SPEC CULT: NORMAL
MICRO REPORT STATUS: NORMAL
SPECIMEN SOURCE: NORMAL

## 2017-09-14 ENCOUNTER — OFFICE VISIT (OUTPATIENT)
Dept: INTERNAL MEDICINE | Facility: CLINIC | Age: 42
End: 2017-09-14
Payer: COMMERCIAL

## 2017-09-14 VITALS
SYSTOLIC BLOOD PRESSURE: 118 MMHG | HEART RATE: 74 BPM | DIASTOLIC BLOOD PRESSURE: 80 MMHG | WEIGHT: 102.9 LBS | TEMPERATURE: 97.7 F | BODY MASS INDEX: 23.81 KG/M2 | HEIGHT: 55 IN | OXYGEN SATURATION: 100 %

## 2017-09-14 DIAGNOSIS — Z23 NEED FOR PROPHYLACTIC VACCINATION AND INOCULATION AGAINST INFLUENZA: ICD-10-CM

## 2017-09-14 DIAGNOSIS — Z00.00 ROUTINE HISTORY AND PHYSICAL EXAMINATION OF ADULT: Primary | ICD-10-CM

## 2017-09-14 PROBLEM — N39.0 URINARY TRACT INFECTION DUE TO EXTENDED-SPECTRUM BETA LACTAMASE (ESBL)-PRODUCING KLEBSIELLA: Status: RESOLVED | Noted: 2017-07-03 | Resolved: 2017-09-14

## 2017-09-14 PROBLEM — A49.8 INFECTION DUE TO ESBL-PRODUCING ESCHERICHIA COLI: Status: RESOLVED | Noted: 2017-07-02 | Resolved: 2017-09-14

## 2017-09-14 PROBLEM — A41.51 SEPSIS DUE TO ESCHERICHIA COLI (E. COLI) (H): Status: RESOLVED | Noted: 2017-07-02 | Resolved: 2017-09-14

## 2017-09-14 PROBLEM — N10 ACUTE PYELONEPHRITIS: Status: RESOLVED | Noted: 2017-07-02 | Resolved: 2017-09-14

## 2017-09-14 PROBLEM — Z16.12 INFECTION DUE TO ESBL-PRODUCING ESCHERICHIA COLI: Status: RESOLVED | Noted: 2017-07-02 | Resolved: 2017-09-14

## 2017-09-14 PROBLEM — B96.89 URINARY TRACT INFECTION DUE TO EXTENDED-SPECTRUM BETA LACTAMASE (ESBL)-PRODUCING KLEBSIELLA: Status: RESOLVED | Noted: 2017-07-03 | Resolved: 2017-09-14

## 2017-09-14 PROCEDURE — 99396 PREV VISIT EST AGE 40-64: CPT | Mod: 25 | Performed by: INTERNAL MEDICINE

## 2017-09-14 PROCEDURE — 90686 IIV4 VACC NO PRSV 0.5 ML IM: CPT | Performed by: INTERNAL MEDICINE

## 2017-09-14 PROCEDURE — 90471 IMMUNIZATION ADMIN: CPT | Performed by: INTERNAL MEDICINE

## 2017-09-14 NOTE — PROGRESS NOTES
Injectable Influenza Immunization Documentation    1.  Are you sick today? (Fever of 100.5 or higher on the day of the clinic)   No    2.  Have you ever had Guillain-[place] Syndrome within 6 weeks of an influenza vaccionation?  No    3. Do you have a life-threatening allergy to eggs?  No    4. Do you have a life-threatening allergy to a component of the vaccine? May include antibiotics, gelatin or latex.  No     5. Have you ever had a reaction to a dose of flu vaccine that needed immediate medical attention?  No     Form completed by Prieto SINGH

## 2017-09-14 NOTE — MR AVS SNAPSHOT
"              After Visit Summary   2017    Antoinette Murphy    MRN: [id number]           Patient Information     Date Of Birth          1975        Visit Information        Provider Department      2017 8:30 AM Sofiya Ulloa MD; [employer] [facility]        Today's Diagnoses     Need for prophylactic vaccination and inoculation against influenza    -  1      Care Instructions    Flu shot today.    -          Follow-ups after your visit        Who to contact     If you have questions or need follow up information about today's clinic visit or your schedule please contact [facility] directly at [phone].  Normal or non-critical lab and imaging results will be communicated to you by MyChart, letter or phone within 4 business days after the clinic has received the results. If you do not hear from us within 7 days, please contact the clinic through MyChart or phone. If you have a critical or abnormal lab result, we will notify you by phone as soon as possible.  Submit refill requests through [employer] or call your pharmacy and they will forward the refill request to us. Please allow 3 business days for your refill to be completed.          Additional Information About Your Visit        MyChart Information     [employer] lets you send messages to your doctor, view your test results, renew your prescriptions, schedule appointments and more. To sign up, go to [email or web address] . Click on \"Log in\" on the left side of the screen, which will take you to the Welcome page. Then click on \"Sign up Now\" on the right side of the page.     You will be asked to enter the access code listed below, as well as some personal information. Please follow the directions to create your username and password.     Your access code is: [email or web address]  Expires: 2017  5:32 PM     Your access code will  in 90 days. If you need help or a new code, " "please call your [place] clinic or [phone].        Care EveryWhere ID     This is your Care EveryWhere ID. This could be used by other organizations to access your [place] medical records  [id number]        Your Vitals Were     Pulse Temperature Height Last Period Pulse Oximetry BMI (Body Mass Index)    74 97.7  F (36.5  C) (Oral) 4' 7\" (1.397 m) 08/24/2017 (Exact Date) 100% 23.92 kg/m2       Blood Pressure from Last 3 Encounters:   09/14/17 118/80   07/11/17 102/68   07/03/17 131/74    Weight from Last 3 Encounters:   09/14/17 102 lb 14.4 oz (46.7 kg)   07/11/17 101 lb 3.2 oz (45.9 kg)   07/02/17 100 lb (45.4 kg)              We Performed the Following     FLU VAC, SPLIT VIRUS IM > 3 YO (QUADRIVALENT) [11627]     Vaccine Administration, Initial [60337]        Primary Care Provider Office Phone # Fax #    Milo Rowe [phone]       [facility] [address] [address]        Equal Access to Services     STEVIE GAR AH: Hadii dale worthy Soveronica, waaxda luqadaha, qaybta kaalmada adeianda, obed hayes. [facility] [phone].    ATENCIÓN: Si habla español, tiene a johnson disposición servicios gratuitos de asistencia lingüística. Reshmaame al [phone].    We comply with applicable federal civil rights laws and [place] laws. We do not discriminate on the basis of race, color, national origin, age, disability sex, sexual orientation or gender identity.            Thank you!     Thank you for choosing [facility]  for your care. Our goal is always to provide you with excellent care. Hearing back from our patients is one way we can continue to improve our services. Please take a few minutes to complete the written survey that you may receive in the mail after your visit with us. Thank you!             Your Updated Medication List - Protect others around you: Learn how to safely use, store and throw " away your medicines at www.disposemymeds.org.      Notice  As of 9/14/2017  9:17 AM    You have not been prescribed any medications.

## 2017-09-14 NOTE — NURSING NOTE
"Chief Complaint   Patient presents with     Physical     RECHECK     Follow up on the hostpital follow up for UTI. Currently no urinary symptoms.       Initial /80 (BP Location: Left arm, Patient Position: Chair, Cuff Size: Adult Regular)  Pulse 74  Temp 97.7  F (36.5  C) (Oral)  Ht 4' 7\" (1.397 m)  Wt 102 lb 14.4 oz (46.7 kg)  LMP 08/24/2017 (Exact Date)  SpO2 100%  BMI 23.92 kg/m2 Estimated body mass index is 23.92 kg/(m^2) as calculated from the following:    Height as of this encounter: 4' 7\" (1.397 m).    Weight as of this encounter: 102 lb 14.4 oz (46.7 kg).  Medication Reconciliation: complete     Kaminibose MA      "

## 2017-09-14 NOTE — PROGRESS NOTES
SUBJECTIVE:                                                      HPI: Antoinette Murphy is a pleasant 42 year old female who presents for a physical.    Bengali  utilized.    No specific complaints, concerns, or questions.    ROS:  Constitutional: denies unintentional weight loss or gain; denies fevers, chills, or sweats     Cardiovascular: denies chest pain, palpitations, or edema  Respiratory: denies cough, wheezing, shortness of breath, or dyspnea on exertion  Gastrointestinal: denies nausea, vomiting, constipation, diarrhea, or abdominal pain  Genitourinary: denies urinary frequency, urgency, dysuria, or hematuria  Integumentary: denies rash or pruritus  Musculoskeletal: denies back pain, muscle pain, joint pain, or joint swelling  Neurologic: denies focal weakness, numbness, or tingling  Hematologic/Immunologic: denies history of anemia or blood transfusions  Endocrine: denies heat or cold intolerance; denies polyuria, polydipsia  Psychiatric: denies anxiety; see preventative health below    Past Medical History:   Diagnosis Date     Sepsis due to Escherichia coli (E. coli) (H) 2017     Past Surgical History:   Procedure Laterality Date      SECTION       Family History   Problem Relation Age of Onset     Hypertension Mother      Hyperlipidemia Mother      Breast Cancer Mother      in 50s; unsure of diagnosis; reports that it was treated and cured with medication     Hypertension Father      Type 2 Diabetes Brother      Myocardial Infarction No family hx of      CEREBROVASCULAR DISEASE No family hx of      Coronary Artery Disease Early Onset No family hx of      Occupational History           Social History Main Topics     Smoking status: Never Smoker     Smokeless tobacco: Never Used     Alcohol use       Comment: rare     Drug use: No     Sexual activity: Yes     Partners: Male     Birth control/ protection: None     Social History Narrative    In long-term  "relationship.    One daughter - 10 years old (as of September, 2017).    .     No formal exercise.      Allergies   Allergen Reactions     Zyrtec [Cetirizine Hcl] Difficulty breathing     Breathing difficulties     MEDS: None    Immunization History   Administered Date(s) Administered     Influenza (IIV3) 12/15/2006, 10/20/2010     TD (ADULT, 7+) 01/01/2008     OBJECTIVE:                                                      /80 (BP Location: Left arm, Patient Position: Chair, Cuff Size: Adult Regular)  Pulse 74  Temp 97.7  F (36.5  C) (Oral)  Ht 4' 7\" (1.397 m)  Wt 102 lb 14.4 oz (46.7 kg)  LMP 08/24/2017 (Exact Date)  SpO2 100%  BMI 23.92 kg/m2  Constitutional: well-appearing  Eyes: normal conjunctivae and lids; pupils equal, round, and reactive to light  Ears, Nose, Mouth, and Throat: normal ears and nose; tympanic membranes visualized and normal; normal lips, teeth, and gums; no oropharyngeal lesions or ulcers  Neck: supple and symmetric; no lymphadenopathy; no thyromegaly or masses  Respiratory: normal respiratory effort; clear to auscultation bilaterally  Cardiovascular: regular rate and rhythm; pedal pulses palpable; no edema  Breasts: normal appearance; no masses or skin retraction; no nipple discharge or bleeding; no axillary lymphadenopathy  Gastrointestinal: soft, non-tender, non-distended, and bowel sounds present; no organomegaly or masses  Musculoskeletal: normal gait and station  Psych: normal judgment and insight; normal mood and affect; recent and remote memory intact; oriented to time, place, and person    PREVENTATIVE HEALTH                                                      BMI: within normal limits   Blood pressure: within normal limits   Mammogram: will defer until age 44  Pap: last pap performed 2016; report reviewed and was normal; repeat due in 2019  Colonoscopy: not medically indicated at this time   Dexa: not medically indicated at this time   Screening HCV: " not medically indicated at this time   Screening cholesterol: not medically indicated at this time   Screening diabetes: not medically indicated at this time   STD testing: no risk factors present  Depression screening: PHQ-2 assessment completed and reviewed - no intervention indicated at this time  Alcohol misuse screening: alcohol use reviewed - no intervention indicated at this time  Immunizations: reviewed; flu shot DUE    ASSESSMENT/PLAN:                                                       (Z00.00) Routine history and physical examination of adult  (primary encounter diagnosis)  Comment: PMH, PSH, FH, SH, medications, allergies, immunizations, and preventative health measures reviewed.   Plan: see below for plans.    (Z23) Need for prophylactic vaccination and inoculation against influenza  Plan: flu shot given today.    The instructions on the AVS were discussed and explained to the patient. Patient expressed understanding of instructions.    (Chart documentation was completed, in part, with FreePriceAlerts voice-recognition software. Even though reviewed, some grammatical, spelling, and word errors may remain.)    Sofiya Ulloa MD   [address] [address]  T: [phone], F: [phone]

## 2017-10-01 ENCOUNTER — OFFICE VISIT (OUTPATIENT)
Dept: URGENT CARE | Facility: URGENT CARE | Age: 42
End: 2017-10-01
Payer: COMMERCIAL

## 2017-10-01 VITALS
TEMPERATURE: 97.3 F | WEIGHT: 103.5 LBS | SYSTOLIC BLOOD PRESSURE: 137 MMHG | BODY MASS INDEX: 24.06 KG/M2 | DIASTOLIC BLOOD PRESSURE: 92 MMHG | HEART RATE: 84 BPM

## 2017-10-01 DIAGNOSIS — N39.0 URINARY TRACT INFECTION WITH HEMATURIA, SITE UNSPECIFIED: ICD-10-CM

## 2017-10-01 DIAGNOSIS — R31.9 URINARY TRACT INFECTION WITH HEMATURIA, SITE UNSPECIFIED: ICD-10-CM

## 2017-10-01 DIAGNOSIS — R82.90 NONSPECIFIC FINDING ON EXAMINATION OF URINE: ICD-10-CM

## 2017-10-01 DIAGNOSIS — R30.0 DYSURIA: Primary | ICD-10-CM

## 2017-10-01 LAB
ALBUMIN UR-MCNC: NEGATIVE MG/DL
APPEARANCE UR: ABNORMAL
BACTERIA #/AREA URNS HPF: ABNORMAL /HPF
BETA HCG QUAL IFA URINE: NEGATIVE
BILIRUB UR QL STRIP: NEGATIVE
COLOR UR AUTO: YELLOW
GLUCOSE UR STRIP-MCNC: NEGATIVE MG/DL
HGB UR QL STRIP: ABNORMAL
KETONES UR STRIP-MCNC: NEGATIVE MG/DL
LEUKOCYTE ESTERASE UR QL STRIP: ABNORMAL
NITRATE UR QL: NEGATIVE
NON-SQ EPI CELLS #/AREA URNS LPF: ABNORMAL /LPF
PH UR STRIP: 5 PH (ref 5–7)
RBC #/AREA URNS AUTO: ABNORMAL /HPF
SOURCE: ABNORMAL
SP GR UR STRIP: <=1.005 (ref 1–1.03)
UROBILINOGEN UR STRIP-ACNC: 0.2 EU/DL (ref 0.2–1)
WBC #/AREA URNS AUTO: >100 /HPF

## 2017-10-01 PROCEDURE — 84703 CHORIONIC GONADOTROPIN ASSAY: CPT | Performed by: NURSE PRACTITIONER

## 2017-10-01 PROCEDURE — 99213 OFFICE O/P EST LOW 20 MIN: CPT | Performed by: NURSE PRACTITIONER

## 2017-10-01 PROCEDURE — 87086 URINE CULTURE/COLONY COUNT: CPT | Performed by: NURSE PRACTITIONER

## 2017-10-01 PROCEDURE — 81001 URINALYSIS AUTO W/SCOPE: CPT | Performed by: NURSE PRACTITIONER

## 2017-10-01 RX ORDER — NITROFURANTOIN 25; 75 MG/1; MG/1
100 CAPSULE ORAL 2 TIMES DAILY
Qty: 14 CAPSULE | Refills: 0 | Status: SHIPPED | OUTPATIENT
Start: 2017-10-01 | End: 2017-12-11

## 2017-10-01 NOTE — MR AVS SNAPSHOT
"              After Visit Summary   10/1/2017    Antoinette Murphy    MRN: [id number]           Patient Information     Date Of Birth          1975        Visit Information        Provider Department      10/1/2017 3:40 PM Lashonda Hodgson NP [facility]        Today's Diagnoses     Dysuria    -  1    Nonspecific finding on examination of urine        Urinary tract infection with hematuria, site unspecified           Follow-ups after your visit        Who to contact     If you have questions or need follow up information about today's clinic visit or your schedule please contact [facility] directly at [phone].  Normal or non-critical lab and imaging results will be communicated to you by MyChart, letter or phone within 4 business days after the clinic has received the results. If you do not hear from us within 7 days, please contact the clinic through MyChart or phone. If you have a critical or abnormal lab result, we will notify you by phone as soon as possible.  Submit refill requests through [employer] or call your pharmacy and they will forward the refill request to us. Please allow 3 business days for your refill to be completed.          Additional Information About Your Visit        MyChart Information     IronPlanet lets you send messages to your doctor, view your test results, renew your prescriptions, schedule appointments and more. To sign up, go to [email or web address] . Click on \"Log in\" on the left side of the screen, which will take you to the Welcome page. Then click on \"Sign up Now\" on the right side of the page.     You will be asked to enter the access code listed below, as well as some personal information. Please follow the directions to create your username and password.     Your access code is: [email or web address]  Expires: 2017  4:38 PM     Your access code will  in 90 days. If you need help or a new code, please call your " [facility] or [phone].        Care EveryWhere ID     This is your Care EveryWhere ID. This could be used by other organizations to access your [place] medical records  [id number]        Your Vitals Were     Pulse Temperature Last Period BMI (Body Mass Index)          84 97.3  F (36.3  C) (Oral) 08/24/2017 (Exact Date) 24.06 kg/m2         Blood Pressure from Last 3 Encounters:   10/01/17 (!) 137/92   09/14/17 118/80   07/11/17 102/68    Weight from Last 3 Encounters:   10/01/17 103 lb 8 oz (46.9 kg)   09/14/17 102 lb 14.4 oz (46.7 kg)   07/11/17 101 lb 3.2 oz (45.9 kg)              We Performed the Following     Beta HCG qual IFA urine - [facility] and [facility]     UA with Microscopic reflex to Culture     Urine Culture Aerobic Bacterial          Today's Medication Changes          These changes are accurate as of: 10/1/17  4:38 PM.  If you have any questions, ask your nurse or doctor.               Start taking these medicines.        Dose/Directions    nitroFURantoin (macrocrystal-monohydrate) 100 MG capsule   Commonly known as:  MACROBID   Used for:  Urinary tract infection with hematuria, site unspecified   Started by:  Lashonda Hodgson, NP        Dose:  100 mg   Take 1 capsule (100 mg) by mouth 2 times daily   Quantity:  14 capsule   Refills:  0            Where to get your medicines      Some of these will need a paper prescription and others can be bought over the counter.  Ask your nurse if you have questions.     Bring a paper prescription for each of these medications     nitroFURantoin (macrocrystal-monohydrate) 100 MG capsule                Primary Care Provider Office Phone # Fax #    Milo Rowe [phone]       [facility] [address] [place] [address]        Equal Access to Services     [facility] RIN AH: Art Wynn, waaxda luqadaha, qaybta kaalmada adesilvanoyalorenzo, obed hayes. So [employer]  [phone].    ATENCIÓN: Si duane robertson, tiene a johnson disposición servicios gratuitos de asistencia lingüística. Aarti al [phone].    We comply with applicable federal civil rights laws and [place] laws. We do not discriminate on the basis of race, color, national origin, age, disability, sex, sexual orientation, or gender identity.            Thank you!     Thank you for choosing [facility]  for your care. Our goal is always to provide you with excellent care. Hearing back from our patients is one way we can continue to improve our services. Please take a few minutes to complete the written survey that you may receive in the mail after your visit with us. Thank you!             Your Updated Medication List - Protect others around you: Learn how to safely use, store and throw away your medicines at www.disposemymeds.org.          This list is accurate as of: 10/1/17  4:38 PM.  Always use your most recent med list.                   Brand Name Dispense Instructions for use Diagnosis    nitroFURantoin (macrocrystal-monohydrate) 100 MG capsule    MACROBID    14 capsule    Take 1 capsule (100 mg) by mouth 2 times daily    Urinary tract infection with hematuria, site unspecified

## 2017-10-01 NOTE — NURSING NOTE
"Chief Complaint   Patient presents with     Fever     fever and pain when urinating for 2-3 days - currently on menses cycle which started on 09/26/2017.       Initial BP (!) 137/92  Pulse 84  Temp 97.3  F (36.3  C) (Oral)  Wt 103 lb 8 oz (46.9 kg)  LMP 08/24/2017 (Exact Date)  BMI 24.06 kg/m2 Estimated body mass index is 24.06 kg/(m^2) as calculated from the following:    Height as of 9/14/17: 4' 7\" (1.397 m).    Weight as of this encounter: 103 lb 8 oz (46.9 kg).  Medication Reconciliation: complete    "

## 2017-10-01 NOTE — PROGRESS NOTES
HPI  Pt c/o dysuria x 2-3 days. + increased urinary frequency. Denies abdominal, back or flank pain. Denies fever, n/v, or hematuria. Pt is currently on her period.     ROS  10 point ROS assessed, documented in HPI otherwise negative.     Physical Exam   Constitutional: She is oriented to person, place, and time and well-developed, well-nourished, and in no distress. No distress.   HENT:   Head: Normocephalic.   Neck: Neck supple.   Cardiovascular: Normal rate and regular rhythm.    Pulmonary/Chest: Effort normal and breath sounds normal. No respiratory distress. She has no wheezes.   Abdominal: Soft. Bowel sounds are normal. She exhibits no distension. There is no tenderness. There is no rebound and no CVA tenderness.   Neurological: She is alert and oriented to person, place, and time.   Skin: Skin is warm and dry. She is not diaphoretic.   Psychiatric: Mood normal.       MDM:  Urine hcg negative. UA reveals >100WBC on microscopy, large LE and few bacteria. There is also blood, however pt is currently on her period. I do not think she has pyelonephritis due to HPI and PE. Urine culture sent and pending. Rx for Macrobid given. We discussed specific s/s that warrant urgent re-evaluation. Pt verbalizes understanding.     Dx:  UTI      Lashonda Hodgson, CNP

## 2017-10-02 LAB
BACTERIA SPEC CULT: NORMAL
SPECIMEN SOURCE: NORMAL

## 2017-12-11 ENCOUNTER — OFFICE VISIT (OUTPATIENT)
Dept: URGENT CARE | Facility: URGENT CARE | Age: 42
End: 2017-12-11
Payer: COMMERCIAL

## 2017-12-11 VITALS
SYSTOLIC BLOOD PRESSURE: 130 MMHG | TEMPERATURE: 101.6 F | BODY MASS INDEX: 24.87 KG/M2 | DIASTOLIC BLOOD PRESSURE: 80 MMHG | OXYGEN SATURATION: 98 % | HEART RATE: 107 BPM | RESPIRATION RATE: 20 BRPM | WEIGHT: 107 LBS

## 2017-12-11 DIAGNOSIS — R82.90 NONSPECIFIC FINDING ON EXAMINATION OF URINE: ICD-10-CM

## 2017-12-11 DIAGNOSIS — N39.0 URINARY TRACT INFECTION WITH HEMATURIA, SITE UNSPECIFIED: Primary | ICD-10-CM

## 2017-12-11 DIAGNOSIS — R31.9 URINARY TRACT INFECTION WITH HEMATURIA, SITE UNSPECIFIED: Primary | ICD-10-CM

## 2017-12-11 LAB
ALBUMIN UR-MCNC: 100 MG/DL
APPEARANCE UR: ABNORMAL
BACTERIA #/AREA URNS HPF: ABNORMAL /HPF
BILIRUB UR QL STRIP: NEGATIVE
COLOR UR AUTO: YELLOW
GLUCOSE UR STRIP-MCNC: NEGATIVE MG/DL
HGB UR QL STRIP: ABNORMAL
KETONES UR STRIP-MCNC: NEGATIVE MG/DL
LEUKOCYTE ESTERASE UR QL STRIP: ABNORMAL
NITRATE UR QL: NEGATIVE
PH UR STRIP: 6 PH (ref 5–7)
RBC #/AREA URNS AUTO: ABNORMAL /HPF
SOURCE: ABNORMAL
SP GR UR STRIP: 1.02 (ref 1–1.03)
UROBILINOGEN UR STRIP-ACNC: 0.2 EU/DL (ref 0.2–1)
WBC #/AREA URNS AUTO: ABNORMAL /HPF

## 2017-12-11 PROCEDURE — 87086 URINE CULTURE/COLONY COUNT: CPT | Performed by: HOSPITALIST

## 2017-12-11 PROCEDURE — 87088 URINE BACTERIA CULTURE: CPT | Performed by: HOSPITALIST

## 2017-12-11 PROCEDURE — 81001 URINALYSIS AUTO W/SCOPE: CPT | Performed by: PHYSICIAN ASSISTANT

## 2017-12-11 PROCEDURE — 87186 SC STD MICRODIL/AGAR DIL: CPT | Performed by: HOSPITALIST

## 2017-12-11 PROCEDURE — 99213 OFFICE O/P EST LOW 20 MIN: CPT | Performed by: HOSPITALIST

## 2017-12-11 RX ORDER — SULFAMETHOXAZOLE/TRIMETHOPRIM 800-160 MG
1 TABLET ORAL 2 TIMES DAILY
Qty: 10 TABLET | Refills: 0 | Status: SHIPPED | OUTPATIENT
Start: 2017-12-11 | End: 2017-12-16

## 2017-12-11 NOTE — MR AVS SNAPSHOT
"              After Visit Summary   2017    Antoinette Murphy    MRN: [id number]           Patient Information     Date Of Birth          1975        Visit Information        Provider Department      2017 7:00 PM Bart Vann MD [facility]        Today's Diagnoses     Urinary tract infection with hematuria, site unspecified    -  1    Nonspecific finding on examination of urine           Follow-ups after your visit        Who to contact     If you have questions or need follow up information about today's clinic visit or your schedule please contact [facility] directly at [phone].  Normal or non-critical lab and imaging results will be communicated to you by MyChart, letter or phone within 4 business days after the clinic has received the results. If you do not hear from us within 7 days, please contact the clinic through MyChart or phone. If you have a critical or abnormal lab result, we will notify you by phone as soon as possible.  Submit refill requests through [employer] Android [employer] or call your pharmacy and they will forward the refill request to us. Please allow 3 business days for your refill to be completed.          Additional Information About Your Visit        MyChart Information     [employer] Android POS lets you send messages to your doctor, view your test results, renew your prescriptions, schedule appointments and more. To sign up, go to [email or web address] . Click on \"Log in\" on the left side of the screen, which will take you to the Welcome page. Then click on \"Sign up Now\" on the right side of the page.     You will be asked to enter the access code listed below, as well as some personal information. Please follow the directions to create your username and password.     Your access code is: [email or web address]  Expires: 2017  3:38 PM     Your access code will  in 90 days. If you need help or a new code, please call your [place] " clinic or [phone].        Care EveryWhere ID     This is your Care EveryWhere ID. This could be used by other organizations to access your [place] medical records  [id number]        Your Vitals Were     Pulse Temperature Respirations Pulse Oximetry BMI (Body Mass Index)       107 101.6  F (38.7  C) 20 98% 24.87 kg/m2        Blood Pressure from Last 3 Encounters:   12/11/17 130/80   10/01/17 (!) 137/92   09/14/17 118/80    Weight from Last 3 Encounters:   12/11/17 107 lb (48.5 kg)   10/01/17 103 lb 8 oz (46.9 kg)   09/14/17 102 lb 14.4 oz (46.7 kg)              We Performed the Following     UA with Microscopic reflex to Culture     Urine Culture Aerobic Bacterial          Today's Medication Changes          These changes are accurate as of: 12/11/17  7:49 PM.  If you have any questions, ask your nurse or doctor.               Start taking these medicines.        Dose/Directions    sulfamethoxazole-trimethoprim 800-160 MG per tablet   Commonly known as:  BACTRIM DS/SEPTRA DS   Used for:  Urinary tract infection with hematuria, site unspecified   Started by:  Bart Vann MD        Dose:  1 tablet   Take 1 tablet by mouth 2 times daily for 5 days   Quantity:  10 tablet   Refills:  0            Where to get your medicines      These medications were sent to [facility] [address] [address]     Phone:  [phone]     sulfamethoxazole-trimethoprim 800-160 MG per tablet                Primary Care Provider Office Phone # Fax #    Milo Rowe [phone]       [facility] [address] [address]        Equal Access to Services     STEVIE GAR AH: Art Wynn, guciho neville, magdalene kaalmada alyssa, obed hayes. So [facility] [phone].    ATENCIÓN: Si habla español, tiene a johnson disposición servicios gratuitos de asistencia  lingüísticaEvangelina Broussard al [phone].    We comply with applicable federal civil rights laws and [place] laws. We do not discriminate on the basis of race, color, national origin, age, disability, sex, sexual orientation, or gender identity.            Thank you!     Thank you for choosing [facility]  for your care. Our goal is always to provide you with excellent care. Hearing back from our patients is one way we can continue to improve our services. Please take a few minutes to complete the written survey that you may receive in the mail after your visit with us. Thank you!             Your Updated Medication List - Protect others around you: Learn how to safely use, store and throw away your medicines at www.disposemymeds.org.          This list is accurate as of: 12/11/17  7:49 PM.  Always use your most recent med list.                   Brand Name Dispense Instructions for use Diagnosis    sulfamethoxazole-trimethoprim 800-160 MG per tablet    BACTRIM DS/SEPTRA DS    10 tablet    Take 1 tablet by mouth 2 times daily for 5 days    Urinary tract infection with hematuria, site unspecified

## 2017-12-12 ENCOUNTER — HOSPITAL ENCOUNTER (EMERGENCY)
Facility: CLINIC | Age: 42
Discharge: HOME | End: 2017-12-12
Attending: EMERGENCY MEDICINE | Admitting: EMERGENCY MEDICINE
Payer: COMMERCIAL

## 2017-12-12 VITALS
HEART RATE: 98 BPM | RESPIRATION RATE: 16 BRPM | TEMPERATURE: 100.2 F | BODY MASS INDEX: 24.76 KG/M2 | WEIGHT: 107 LBS | HEIGHT: 55 IN | OXYGEN SATURATION: 99 % | DIASTOLIC BLOOD PRESSURE: 79 MMHG | SYSTOLIC BLOOD PRESSURE: 126 MMHG

## 2017-12-12 DIAGNOSIS — N10 ACUTE PYELONEPHRITIS: ICD-10-CM

## 2017-12-12 LAB
ALBUMIN UR-MCNC: NEGATIVE MG/DL
ANION GAP SERPL CALCULATED.3IONS-SCNC: 8 MMOL/L (ref 3–14)
APPEARANCE UR: CLEAR
BACTERIA #/AREA URNS HPF: ABNORMAL /HPF
BASOPHILS # BLD AUTO: 0 10E9/L (ref 0–0.2)
BASOPHILS NFR BLD AUTO: 0.3 %
BILIRUB UR QL STRIP: NEGATIVE
BUN SERPL-MCNC: 8 MG/DL (ref 7–30)
CALCIUM SERPL-MCNC: 9.4 MG/DL (ref 8.5–10.1)
CHLORIDE SERPL-SCNC: 103 MMOL/L (ref 94–109)
CO2 BLDCOV-SCNC: 22 MMOL/L (ref 21–28)
CO2 SERPL-SCNC: 23 MMOL/L (ref 20–32)
COLOR UR AUTO: YELLOW
CREAT SERPL-MCNC: 0.71 MG/DL (ref 0.52–1.04)
DIFFERENTIAL METHOD BLD: ABNORMAL
EOSINOPHIL # BLD AUTO: 0 10E9/L (ref 0–0.7)
EOSINOPHIL NFR BLD AUTO: 0.2 %
ERYTHROCYTE [DISTWIDTH] IN BLOOD BY AUTOMATED COUNT: 14.7 % (ref 10–15)
GFR SERPL CREATININE-BSD FRML MDRD: 90 ML/MIN/1.7M2
GLUCOSE SERPL-MCNC: 92 MG/DL (ref 70–99)
GLUCOSE UR STRIP-MCNC: NEGATIVE MG/DL
HCT VFR BLD AUTO: 38.6 % (ref 35–47)
HGB BLD-MCNC: 12.7 G/DL (ref 11.7–15.7)
HGB UR QL STRIP: ABNORMAL
IMM GRANULOCYTES # BLD: 0.1 10E9/L (ref 0–0.4)
IMM GRANULOCYTES NFR BLD: 0.4 %
KETONES UR STRIP-MCNC: ABNORMAL MG/DL
LACTATE BLD-SCNC: 1 MMOL/L (ref 0.7–2.1)
LEUKOCYTE ESTERASE UR QL STRIP: ABNORMAL
LYMPHOCYTES # BLD AUTO: 1.7 10E9/L (ref 0.8–5.3)
LYMPHOCYTES NFR BLD AUTO: 10.9 %
MCH RBC QN AUTO: 22.7 PG (ref 26.5–33)
MCHC RBC AUTO-ENTMCNC: 32.9 G/DL (ref 31.5–36.5)
MCV RBC AUTO: 69 FL (ref 78–100)
MONOCYTES # BLD AUTO: 1.2 10E9/L (ref 0–1.3)
MONOCYTES NFR BLD AUTO: 7.5 %
MUCOUS THREADS #/AREA URNS LPF: PRESENT /LPF
NEUTROPHILS # BLD AUTO: 12.8 10E9/L (ref 1.6–8.3)
NEUTROPHILS NFR BLD AUTO: 80.7 %
NITRATE UR QL: NEGATIVE
NON-SQ EPI CELLS #/AREA URNS LPF: ABNORMAL /LPF
NRBC # BLD AUTO: 0 10*3/UL
NRBC BLD AUTO-RTO: 0 /100
PCO2 BLDV: 32 MM HG (ref 40–50)
PH BLDV: 7.44 PH (ref 7.32–7.43)
PH UR STRIP: 6 PH (ref 5–7)
PLATELET # BLD AUTO: 284 10E9/L (ref 150–450)
PO2 BLDV: 73 MM HG (ref 25–47)
POTASSIUM SERPL-SCNC: 3.9 MMOL/L (ref 3.4–5.3)
RBC # BLD AUTO: 5.59 10E12/L (ref 3.8–5.2)
RBC #/AREA URNS AUTO: ABNORMAL /HPF
SAO2 % BLDV FROM PO2: 95 %
SODIUM SERPL-SCNC: 134 MMOL/L (ref 133–144)
SOURCE: ABNORMAL
SP GR UR STRIP: <=1.005 (ref 1–1.03)
UROBILINOGEN UR STRIP-ACNC: 0.2 EU/DL (ref 0.2–1)
WBC # BLD AUTO: 15.9 10E9/L (ref 4–11)
WBC #/AREA URNS AUTO: ABNORMAL /HPF

## 2017-12-12 PROCEDURE — 25000125 ZZHC RX 250: Performed by: PHYSICIAN ASSISTANT

## 2017-12-12 PROCEDURE — 81001 URINALYSIS AUTO W/SCOPE: CPT | Performed by: EMERGENCY MEDICINE

## 2017-12-12 PROCEDURE — 25000128 H RX IP 250 OP 636: Performed by: PHYSICIAN ASSISTANT

## 2017-12-12 PROCEDURE — 25000132 ZZH RX MED GY IP 250 OP 250 PS 637: Performed by: PHYSICIAN ASSISTANT

## 2017-12-12 PROCEDURE — 83605 ASSAY OF LACTIC ACID: CPT

## 2017-12-12 PROCEDURE — 99284 EMERGENCY DEPT VISIT MOD MDM: CPT | Mod: 25

## 2017-12-12 PROCEDURE — 80048 BASIC METABOLIC PNL TOTAL CA: CPT | Performed by: PHYSICIAN ASSISTANT

## 2017-12-12 PROCEDURE — 82803 BLOOD GASES ANY COMBINATION: CPT

## 2017-12-12 PROCEDURE — 85025 COMPLETE CBC W/AUTO DIFF WBC: CPT | Performed by: PHYSICIAN ASSISTANT

## 2017-12-12 PROCEDURE — 96365 THER/PROPH/DIAG IV INF INIT: CPT

## 2017-12-12 RX ORDER — LEVOFLOXACIN 500 MG/1
500 TABLET, FILM COATED ORAL DAILY
Qty: 5 TABLET | Refills: 0 | Status: SHIPPED | OUTPATIENT
Start: 2017-12-12 | End: 2017-12-19

## 2017-12-12 RX ORDER — ONDANSETRON 4 MG/1
4 TABLET, ORALLY DISINTEGRATING ORAL EVERY 8 HOURS PRN
Qty: 8 TABLET | Refills: 0 | Status: SHIPPED | OUTPATIENT
Start: 2017-12-12 | End: 2017-12-15

## 2017-12-12 RX ORDER — IBUPROFEN 600 MG/1
600 TABLET, FILM COATED ORAL EVERY 6 HOURS PRN
Qty: 20 TABLET | Refills: 0 | Status: SHIPPED | OUTPATIENT
Start: 2017-12-12 | End: 2017-12-20

## 2017-12-12 RX ORDER — ACETAMINOPHEN 325 MG/1
650 TABLET ORAL ONCE
Status: COMPLETED | OUTPATIENT
Start: 2017-12-12 | End: 2017-12-12

## 2017-12-12 RX ORDER — ONDANSETRON 4 MG/1
4 TABLET, ORALLY DISINTEGRATING ORAL ONCE
Status: COMPLETED | OUTPATIENT
Start: 2017-12-12 | End: 2017-12-12

## 2017-12-12 RX ORDER — LEVOFLOXACIN 5 MG/ML
750 INJECTION, SOLUTION INTRAVENOUS ONCE
Status: COMPLETED | OUTPATIENT
Start: 2017-12-12 | End: 2017-12-12

## 2017-12-12 RX ADMIN — SODIUM CHLORIDE 1000 ML: 9 INJECTION, SOLUTION INTRAVENOUS at 16:51

## 2017-12-12 RX ADMIN — ONDANSETRON 4 MG: 4 TABLET, ORALLY DISINTEGRATING ORAL at 18:35

## 2017-12-12 RX ADMIN — LEVOFLOXACIN 750 MG: 5 INJECTION, SOLUTION INTRAVENOUS at 17:04

## 2017-12-12 RX ADMIN — ACETAMINOPHEN 650 MG: 325 TABLET, FILM COATED ORAL at 16:51

## 2017-12-12 ASSESSMENT — ENCOUNTER SYMPTOMS
FEVER: 1
HEMATURIA: 0
FREQUENCY: 1
DYSURIA: 1

## 2017-12-12 NOTE — ED PROVIDER NOTES
History     Chief Complaint:  Flank pain      HPI   Boyfriend at bedside acting assisting patient with interpreting.     Antoinette Murphy is a 42 year old female with a history of kidney infections who presents to the ED for evaluation of left flank pain. The patient has had dysuria for one week which concerned her and prompted a visit to urgent care on . She was prescribed Bactrim and took a dose last night and this morning, but has had increased urinary frequency, persistent right flank pain, and a subjective fever. The patient denies hematuria. She denies nausea, vomiting.     Of note, the patient was hospitalized for kidney infection in July and urine culture collected on 2017 grew out ESBL.        Allergies:  Allergies   Allergen Reactions     Zyrtec [Cetirizine Hcl] Difficulty breathing     Breathing difficulties        Medications:      levofloxacin (LEVAQUIN) 500 MG tablet   ibuprofen (ADVIL/MOTRIN) 600 MG tablet   ondansetron (ZOFRAN ODT) 4 MG ODT tab   sulfamethoxazole-trimethoprim (BACTRIM DS/SEPTRA DS) 800-160 MG per tablet       Past Medical History:    Past Medical History:   Diagnosis Date     Sepsis due to Escherichia coli (E. coli) (H) 2017       Past Surgical History:    Past Surgical History:   Procedure Laterality Date      SECTION         Family History:    Family History   Problem Relation Age of Onset     Hypertension Mother      Hyperlipidemia Mother      Breast Cancer Mother      in 50s; unsure of diagnosis; reports that it was treated and cured with medication     Hypertension Father      Type 2 Diabetes Brother      Myocardial Infarction No family hx of      CEREBROVASCULAR DISEASE No family hx of      Coronary Artery Disease Early Onset No family hx of        Social History:  Marital Status:   [2]  Social History   Substance Use Topics     Smoking status: Never Smoker     Smokeless tobacco: Never Used     Alcohol use 0.0 oz/week     0 Standard drinks or  "equivalent per week      Comment: rare        Review of Systems   Constitutional: Positive for fever.   Genitourinary: Positive for dysuria and frequency. Negative for hematuria.   All other systems reviewed and are negative.      Physical Exam   First Vitals:   Patient Vitals for the past 24 hrs:   BP Temp Temp src Pulse Resp SpO2 Height Weight   12/12/17 1836 126/79 - - 98 16 99 % - -   12/12/17 1618 133/85 100.2  F (37.9  C) Oral 119 16 98 % 1.397 m (4' 7\") 48.5 kg (107 lb)        Physical Exam  General: Alert, interactive in mild distress secondary to left flank pain. Non-toxic appearing. Sitting upright helping provide history (mild language barrier, boyfriend at bedside assisting)  Head:  Scalp is atraumatic.  Eyes:  EOM intact. The pupils are equal, round, and reactive to light. No scleral icterus.  ENT:                                      Ears:  The external ears are normal.  Nose:  The external nose is normal.  Throat:  The oropharynx is normal. Mucus membranes are moist.                 Neck:  Normal range of motion. There is no rigidity. Trachea midline.  CV:  Tachycardic rate and regular rhythm. No murmur. 2+ radial pulses   Resp:  Breath sounds are clear bilaterally. Non-labored, no retractions or accessory muscle use.  GI:  Abdomen is soft, no distension, no tenderness. Left CVA tenderness.   MS:  Normal strength in all 4 extremities  Skin:  Warm and dry, No rash or lesions noted.  Neuro:  Strength 5/5 x4. Sensation grossly intact. GCS: 15  Psych:  Awake. Alert and orientedx3. Appropriate interactions.   Lymph: No anterior or posterior cervical lymphadenopathy noted.       Emergency Department Course     Laboratory:  Labs Ordered and Resulted from Time of ED Arrival Up to the Time of Departure from the ED   CBC WITH PLATELETS DIFFERENTIAL - Abnormal; Notable for the following:        Result Value    WBC 15.9 (*)     RBC Count 5.59 (*)     MCV 69 (*)     MCH 22.7 (*)     Absolute Neutrophil 12.8 (*)  "    All other components within normal limits   ISTAT  GASES LACTATE DUKE POCT - Abnormal; Notable for the following:     Ph Venous 7.44 (*)     PCO2 Venous 32 (*)     PO2 Venous 73 (*)     All other components within normal limits   BASIC METABOLIC PANEL      Interventions:  Medications   acetaminophen (TYLENOL) tablet 650 mg (650 mg Oral Given 12/12/17 1651)   0.9% sodium chloride BOLUS (0 mLs Intravenous Stopped 12/12/17 1823)   levofloxacin (LEVAQUIN) infusion 750 mg (0 mg Intravenous Stopped 12/12/17 1824)   ondansetron (ZOFRAN-ODT) ODT tab 4 mg (4 mg Oral Given 12/12/17 1835)      Emergency Department Course:  Past medical records, nursing notes, and vitals reviewed.   I performed an exam of the patient and obtained history, as documented above. GCS 15.     Blood drawn and IV inserted  Supervising provider also interviewed and examined the patient at bedside and agrees with the plan.    I rechecked the patient and had a lengthy discussion regarding going home or coming into the hospital. The patient wishes to go home and feels comfortable with this option.  Findings and plan explained to the Patient and boyfriend. Patient discharged home with instructions regarding supportive care, medications, and reasons to return. The importance of close follow-up was reviewed.      Impression & Plan      Medical Decision Making:  Antoinette Murphy is a 42 year old female, diagnosed with cystitis yesterday and started on Bactrim presents with dysuria and urinary frequency. The patient was hospitalized in early July for pyelonephritis and culture grew out ESBL. On arrival, the patient is tachycardic and febrile. Non-toxic appearing. No abdominal tenderness, though does have left CVA tenderness on exam. Lactic acid wnl. The patient has been able to drink fluids without vomiting. She was given 1 liter of saline and Levofloxacin, which was sensitive on urine culture 6/30/2017. I do not feel imaging is warranted at this time as I  have low suspicion for kidney stone and imaging would not . Discussed inpatient management vs. Home management and the patient agrees with plan for discharge home with oral Levaquin. Plan for close follow up with primary care in 2 days and return to the emergency department for worsening fever, intractable vomiting, or significantly worsening pain. Tylenol/Ibuprofen for pain and fever control.      Diagnosis:    ICD-10-CM    1. Acute pyelonephritis N10 UA reflex to Microscopic and Culture     UA reflex to Microscopic and Culture     Urine Microscopic     Urine Microscopic     CANCELED: UA reflex to Microscopic and Culture       Disposition:  Discharged to home    Discharge Medications:  Discharge Medication List as of 12/12/2017  6:24 PM      START taking these medications    Details   levofloxacin (LEVAQUIN) 500 MG tablet Take 1 tablet (500 mg) by mouth daily for 7 days, Disp-5 tablet, R-0, Local Print      ibuprofen (ADVIL/MOTRIN) 600 MG tablet Take 1 tablet (600 mg) by mouth every 6 hours as needed for moderate pain, Disp-20 tablet, R-0, Local Print           ILeana, am serving as a scribe on 12/12/2017 at 4:24 PM to personally document services performed by Rosenda Chopra PA-C based on my observations and the provider's statements to me.       Rosenda Chopra PA-C  Supervising Provider, Dr. Ocampo    12/12/2017    EMERGENCY DEPARTMENT       Rosenda Chopra PA-C  12/12/17 2007

## 2017-12-12 NOTE — PROGRESS NOTES
Pt     Allergies   Allergen Reactions     Zyrtec [Cetirizine Hcl] Difficulty breathing     Breathing difficulties       Past Medical History:   Diagnosis Date     Sepsis due to Escherichia coli (E. coli) (H) 7/2/2017         No current outpatient prescriptions on file prior to visit.  No current facility-administered medications on file prior to visit.     Social History   Substance Use Topics     Smoking status: Never Smoker     Smokeless tobacco: Never Used     Alcohol use 0.0 oz/week     0 Standard drinks or equivalent per week      Comment: rare       ROS:  Consitutional: As above  ENT: As above  Respiratory: As above    OBJECTIVE:  /80  Pulse 107  Temp 101.6  F (38.7  C)  Resp 20  Wt 107 lb (48.5 kg)  SpO2 98%  BMI 24.87 kg/m2  GENERAL APPEARANCE: healthy, alert and moderate distress  EYES: conjunctiva clear  ERESP: lungs clear to auscultation - no rales, rhonchi or wheezes  CV: regular rates and rhythm, normal S1 S2, no murmur noted  NEURO: awake, alert        Recent Results (from the past 168 hour(s))   UA with Microscopic reflex to Culture    Collection Time: 12/11/17  7:26 PM   Result Value Ref Range    Color Urine Yellow     Appearance Urine Slightly Cloudy     Glucose Urine Negative NEG^Negative mg/dL    Bilirubin Urine Negative NEG^Negative    Ketones Urine Negative NEG^Negative mg/dL    Specific Gravity Urine 1.025 1.003 - 1.035    pH Urine 6.0 5.0 - 7.0 pH    Protein Albumin Urine 100 (A) NEG^Negative mg/dL    Urobilinogen Urine 0.2 0.2 - 1.0 EU/dL    Nitrite Urine Negative NEG^Negative    Blood Urine Moderate (A) NEG^Negative    Leukocyte Esterase Urine Large (A) NEG^Negative    Source Midstream Urine     WBC Urine  (A) OTO2^O - 2 /HPF    RBC Urine  (A) OTO2^O - 2 /HPF    Bacteria Urine Moderate (A) NEG^Negative /HPF        ASSESSMENT:     ICD-10-CM    1. Urinary tract infection with hematuria, site unspecified N39.0 UA with Microscopic reflex to Culture    R31.9  sulfamethoxazole-trimethoprim (BACTRIM DS/SEPTRA DS) 800-160 MG per tablet   2. Nonspecific finding on examination of urine R82.90 Urine Culture Aerobic Bacterial         PLAN:    Will start bactrim bid for 5 days. Tylenol and ibuprofen prn for  pain  Lots of rest and fluids.  Follow up in 4-7 days if not better or sooner if getting worse .    Bart Vann MD

## 2017-12-12 NOTE — NURSING NOTE
"Chief Complaint   Patient presents with     UTI     Pt c/o possible UTI sxs with dysuria X 1 week. Pt developed a fever and back pain today R/O pyelonephritis.     Urgent Care       Initial /80  Pulse 107  Temp 101.6  F (38.7  C)  Resp 20  Wt 107 lb (48.5 kg)  SpO2 98%  BMI 24.87 kg/m2 Estimated body mass index is 24.87 kg/(m^2) as calculated from the following:    Height as of 9/14/17: 4' 7\" (1.397 m).    Weight as of this encounter: 107 lb (48.5 kg).  Medication Reconciliation: complete    "

## 2017-12-12 NOTE — ED AVS SNAPSHOT
"  Emergency Department    [address] [facility] [address]    Phone:  [phone]    Fax:  [phone]                                       Antoinette Murphy   MRN: [id number]    Department:   Emergency Department   Date of Visit:  12/12/2017           Patient Information     Date Of Birth          1975        Your diagnoses for this visit were:     Acute pyelonephritis        You were seen by Vinny Ocampo MD.      Follow-up Information     Follow up with System, Provider Not In In 2 days.    Specialty:  Clinic    Why:  follow up with primary care provider    Contact information:               Follow up with Layo Avila MD In 2 days.    Specialty:  Internal Medicine    Contact information:    [address]  [facility] [address] [phone]          Follow up with  Emergency Department.    Specialty:  EMERGENCY MEDICINE    Why:  As needed, If symptoms worsen    Contact information:    [address] [facility] [address] [phone]        Discharge Instructions       *You may resume diet and activities as tolerated.  *Take medications as prescribed. Levofloxacin as directed. Continue your current medications.  *Follow up with your doctor in the next 2-3 days for a recheck.,   *Return if you develop worsening fever, back pain, vomiting, unable to urinate, faint or feel like you will faint or become worse in any way.        Annamarie?m Trùng Th?n [Kidney Infection, Female: Adult]  Annamarie?m trùng th?n c? ng ?? ?c g?i là \"viêm th?n-b? th?n  ( pyelonephritis\"). Nó th? ?ng b? t ? ? u nh? là m ?t s? annamarie?m trùng bàng neal [\"viêm bàng neal  ( cystitis\")] karol t?i th?n. Viêm th?n-b? th?n tr?m tr? ng h?n annamarie?m trùng bàng neal. Nó có th? gây nên m?t ch?ng b?nh tr?m tr?ng n? u không ?i ?u tr? ?úng cách .    Các tri?u ch? ng thông th? ?ng fareed g? m ?au nhói ? l?ng , m?t bên b?ng ho?c b? ng d? ?i. Các tri?u ch?ng khác có th? fareed g?m s?t, l?nh run, bu?n nôn, ói m?a, " mu?n ?i ti ?u và c?m giác rát khi ?i ti ?u.  Ch?m Sóc T?i Briseyda:  1) ? nhà, ? ? ng ?i làm ho ? c ?i h ?c. Ngh? ng ? i trên gi? ?ng cho ? ?n khi h? t c?n s ?t ho?c quý v? th? y ? ? h?n .  2) U?ng bala? u n? ?c (ít nh?t là 6-8 ly m?i ngày), tr? khi quý v? ph?i h?n ch? ch?t l?ng vì các lý do y edith khác. ? i?u này s? ? ?y thu?c vào sudeep h? th?ng ni?u c?a quý v? và ? ?y vi khu?n ra kh? i c? th ? c?a quý v?.  3) Tránh giao h? p cho ? ?n khi quý v? ? ã dùng h?t thu?c và t?t c? các tri?u ch?ng c?a quý v? ? ã bi?n m?t.  4) Tránh cafêin, r? ?u và các th? c ? n có briseyda v? có th? kích thích th?n và bàng neal.  5) Quý v? có th? dùng acetaminophen (Tylenol) ho? c ibuprofen (Motrin, Advil) ? ? ki? m soát c?n ?au, tr ? khi ? ã ?? ?c cho dùng m?t lo?i thu?c ch? ng ?au khác. [L?U Ý: N?u quý v? b? b?nh jane ho?c th?n mãn tính ho?c t?ng b? loét fareed t? (stomach ulcer) ho?c gibson?t young? t ?? ?ng tiêu hóa (GI bleeding), hãy bàn v?i bác s? c?a quý v? tr? ?c khi dùng các lo?i thu?c này.]  Ti?p T?c Thiago Dõi  v?i bác s? c?a quý v? ho?c thiago s? h? ?ng d?n c?a nhân viên chúng tôi ? ? tái xét hilda? m n? ?c ti?u sudeep 10 ngày. ? i?u này s? b?o ? ?m là s? bala?m trùng c?a quý v? ? ã d?t hoàn toàn.  [L?U Ý: N?u quý v? ? ã ch?p neal bjorn?n ho?c ch?p CT, bác s? chuyên edith s? xem l?i vi?c này. Quý v? s? ?? ?c thông báo v? b?t k? khám phá m?i nào có th? ? nh h? ? ng ? ?n vi? c ch?m sóc c ?a quý v?.]  N?u x?y ra b?t c? ?i?u nào gen ?ây hãy L?P T?C ?I?U TR? Y T?:  -- S?t trên 100.4  F (38.0  C) gen 48 gi? ?i ?u tr?  -- Không ? ? h?n vào ngày th ? ba ?? ? c ?i ?u tr?  -- ?au l?ng ho?c ?au b?ng briseyda t?ng  -- Ói m?a bala?u l?n ho?c không th? u?ng thu?c  -- Emily y?u, chóng m?t ho?c ng?t x?u  Date Last Reviewed: 2/5/2016 2000-2017 [employer]. [address] [place] [address], [place] [address]. All rights reserved. This information is not intended as a substitute for professional medical care. Always follow your healthcare professional's  instructions.              24 Hour Appointment Hotline       To make an appointment at any [facility], call [phone] ([phone]). If you don't have a family doctor or clinic, we will help you find one. [facility] are conveniently located to serve the needs of you and your family.             Review of your medicines      START taking        Dose / Directions Last dose taken    ibuprofen 600 MG tablet   Commonly known as:  ADVIL/MOTRIN   Dose:  600 mg   Quantity:  20 tablet        Take 1 tablet (600 mg) by mouth every 6 hours as needed for moderate pain   Refills:  0        levofloxacin 500 MG tablet   Commonly known as:  LEVAQUIN   Dose:  500 mg   Quantity:  5 tablet        Take 1 tablet (500 mg) by mouth daily for 7 days   Refills:  0          Our records show that you are taking the medicines listed below. If these are incorrect, please call your family doctor or clinic.        Dose / Directions Last dose taken    sulfamethoxazole-trimethoprim 800-160 MG per tablet   Commonly known as:  BACTRIM DS/SEPTRA DS   Dose:  1 tablet   Quantity:  10 tablet        Take 1 tablet by mouth 2 times daily for 5 days   Refills:  0                Prescriptions were sent or printed at these locations (2 Prescriptions)                   Other Prescriptions                Printed at Department/Unit printer (2 of 2)         levofloxacin (LEVAQUIN) 500 MG tablet               ibuprofen (ADVIL/MOTRIN) 600 MG tablet                Procedures and tests performed during your visit     Basic metabolic panel    CBC with platelets differential    ISTAT gases lactate armin POCT      Orders Needing Specimen Collection     Ordered          12/12/17 8321  UA reflex to Microscopic and Culture - STAT, Prio: STAT, Needs to be Collected     Scheduled Task Status   12/12/17 1638 Collect UA reflex to Microscopic and Culture Open   Order Class:  PCU Collect                  Pending Results     Date and Time Order Name Status  Description    12/11/2017 1938 URINE CULTURE AEROBIC BACTERIAL Preliminary             Pending Culture Results     Date and Time Order Name Status Description    12/11/2017 1938 URINE CULTURE AEROBIC BACTERIAL Preliminary             Pending Results Instructions     If you had any lab results that were not finalized at the time of your Discharge, you can call the ED Lab Result RN at [phone]. You will be contacted by this team for any positive Lab results or changes in treatment. The nurses are available 7 days a week from 10A to 6:30P.  You can leave a message 24 hours per day and they will return your call.        Test Results From Your Hospital Stay        12/12/2017  5:12 PM      Component Results     Component Value Ref Range & Units Status    WBC 15.9 (H) 4.0 - 11.0 10e9/L Final    RBC Count 5.59 (H) 3.8 - 5.2 10e12/L Final    Hemoglobin 12.7 11.7 - 15.7 g/dL Final    Hematocrit 38.6 35.0 - 47.0 % Final    MCV 69 (L) 78 - 100 fl Final    MCH 22.7 (L) 26.5 - 33.0 pg Final    MCHC 32.9 31.5 - 36.5 g/dL Final    RDW 14.7 10.0 - 15.0 % Final    Platelet Count 284 150 - 450 10e9/L Final    Diff Method Automated Method  Final    % Neutrophils 80.7 % Final    % Lymphocytes 10.9 % Final    % Monocytes 7.5 % Final    % Eosinophils 0.2 % Final    % Basophils 0.3 % Final    % Immature Granulocytes 0.4 % Final    Nucleated RBCs 0 0 /100 Final    Absolute Neutrophil 12.8 (H) 1.6 - 8.3 10e9/L Final    Absolute Lymphocytes 1.7 0.8 - 5.3 10e9/L Final    Absolute Monocytes 1.2 0.0 - 1.3 10e9/L Final    Absolute Eosinophils 0.0 0.0 - 0.7 10e9/L Final    Absolute Basophils 0.0 0.0 - 0.2 10e9/L Final    Abs Immature Granulocytes 0.1 0 - 0.4 10e9/L Final    Absolute Nucleated RBC 0.0  Final         12/12/2017  5:33 PM      Component Results     Component Value Ref Range & Units Status    Sodium 134 133 - 144 mmol/L Final    Potassium 3.9 3.4 - 5.3 mmol/L Final    Chloride 103 94 - 109 mmol/L Final    Carbon Dioxide 23 20 - 32  mmol/L Final    Anion Gap 8 3 - 14 mmol/L Final    Glucose 92 70 - 99 mg/dL Final    Urea Nitrogen 8 7 - 30 mg/dL Final    Creatinine 0.71 0.52 - 1.04 mg/dL Final    GFR Estimate 90 >60 mL/min/1.7m2 Final    Non  GFR Calc    GFR Estimate If Black >90 >60 mL/min/1.7m2 Final    African American GFR Calc    Calcium 9.4 8.5 - 10.1 mg/dL Final               12/12/2017  5:31 PM      Component Results     Component Value Ref Range & Units Status    Ph Venous 7.44 (H) 7.32 - 7.43 pH Final    PCO2 Venous 32 (L) 40 - 50 mm Hg Final    PO2 Venous 73 (H) 25 - 47 mm Hg Final    Bicarbonate Venous 22 21 - 28 mmol/L Final    O2 Sat Venous 95 % Final    Lactic Acid 1.0 0.7 - 2.1 mmol/L Final                Clinical Quality Measure: Blood Pressure Screening     Your blood pressure was checked while you were in the emergency department today. The last reading we obtained was  BP: 133/85 . Please read the guidelines below about what these numbers mean and what you should do about them.  If your systolic blood pressure (the top number) is less than 120 and your diastolic blood pressure (the bottom number) is less than 80, then your blood pressure is normal. There is nothing more that you need to do about it.  If your systolic blood pressure (the top number) is 120-139 or your diastolic blood pressure (the bottom number) is 80-89, your blood pressure may be higher than it should be. You should have your blood pressure rechecked within a year by a primary care provider.  If your systolic blood pressure (the top number) is 140 or greater or your diastolic blood pressure (the bottom number) is 90 or greater, you may have high blood pressure. High blood pressure is treatable, but if left untreated over time it can put you at risk for heart attack, stroke, or kidney failure. You should have your blood pressure rechecked by a primary care provider within the next 4 weeks.  If your provider in the emergency department today  "gave you specific instructions to follow-up with your doctor or provider even sooner than that, you should follow that instruction and not wait for up to 4 weeks for your follow-up visit.        Thank you for choosing [facility]       Thank you for choosing [facility] for your care. Our goal is always to provide you with excellent care. Hearing back from our patients is one way we can continue to improve our services. Please take a few minutes to complete the written survey that you may receive in the mail after you visit with us. Thank you!        [employer] Information     [employer] lets you send messages to your doctor, view your test results, renew your prescriptions, schedule appointments and more. To sign up, go to [email or web address] . Click on \"Log in\" on the left side of the screen, which will take you to the Welcome page. Then click on \"Sign up Now\" on the right side of the page.     You will be asked to enter the access code listed below, as well as some personal information. Please follow the directions to create your username and password.     Your access code is: [email or web address]  Expires: 2017  3:38 PM     Your access code will  in 90 days. If you need help or a new code, please call your [facility] clinic or [phone].        Care EveryWhere ID     This is your Care EveryWhere ID. This could be used by other organizations to access your [facility] medical records  [id number]        Equal Access to Services     DAMI GAR : Hadii dale Wynn, waaxda jamin, qaybta thomasalobed joy . So [facility] [phone].    ATENCIÓN: Si habla español, tiene a johnson disposición servicios gratuitos de asistencia lingüística. Llame al [phone].    We comply with applicable federal civil rights laws and [place] laws. We do not discriminate on the basis of race, color, national origin, age, disability, sex, sexual orientation, or gender identity.          "   After Visit Summary       This is your record. Keep this with you and show to your community pharmacist(s) and doctor(s) at your next visit.

## 2017-12-12 NOTE — ED AVS SNAPSHOT
Emergency Department    [address] [facility] [address]    Phone:  [phone]    Fax:  [phone]                                       Antoinette Murphy   MRN: [id number]    Department:   Emergency Department   Date of Visit:  12/12/2017           After Visit Summary Signature Page     I have received my discharge instructions, and my questions have been answered. I have discussed any challenges I see with this plan with the nurse or doctor.    ..........................................................................................................................................  Patient/Patient Representative Signature      ..........................................................................................................................................  Patient Representative Print Name and Relationship to Patient    ..................................................               ................................................  Date                                            Time    ..........................................................................................................................................  Reviewed by Signature/Title    ...................................................              ..............................................  Date                                                            Time

## 2017-12-13 NOTE — DISCHARGE INSTRUCTIONS
"*You may resume diet and activities as tolerated.  *Take medications as prescribed. Levofloxacin as directed. Continue your current medications.  *Follow up with your doctor in the next 2-3 days for a recheck.,   *Return if you develop worsening fever, back pain, vomiting, unable to urinate, faint or feel like you will faint or become worse in any way.        Annamarie?m Trùng Th?n [Kidney Infection, Female: Adult]  Annamarie?m trùng th?n c? ng ?? ?c g?i là \"viêm th?n-b? th?n  ( pyelonephritis\"). Nó th? ?ng b? t ? ? u nh? là m ?t s? annamarie?m trùng bàng neal [\"viêm bàng neal  ( cystitis\")] karol t?i th?n. Viêm th?n-b? th?n tr?m tr? ng h?n annamarie?m trùng bàng neal. Nó có th? gây nên m?t ch?ng b?nh tr?m tr?ng n? u không ?i ?u tr? ?úng cách .    Các tri?u ch? ng thông th? ?ng fareed g? m ?au nhói ? l?ng , m?t bên b?ng ho?c b? ng d? ?i. Các tri?u ch?ng khác có th? fareed g?m s?t, l?nh run, bu?n nôn, ói m?a, mu?n ?i ti ?u và c?m giác rát khi ?i ti ?u.  Ch?m Sóc T?i Briseyda:  1) ? nhà, ? ? ng ?i làm ho ? c ?i h ?c. Ngh? ng ? i trên gi? ?ng cho ? ?n khi h? t c?n s ?t ho?c quý v? th? y ? ? h?n .  2) U?ng annamarie? u n? ?c (ít nh?t là 6-8 ly m?i ngày), tr? khi quý v? ph?i h?n ch? ch?t l?ng vì các lý do y edith khác. ? i?u này s? ? ?y thu?c vào sudeep h? th?ng ni?u c?a quý v? và ? ?y vi khu?n ra kh? i c? th ? c?a quý v?.  3) Tránh giao h? p cho ? ?n khi quý v? ? ã dùng h?t thu?c và t?t c? các tri?u ch?ng c?a quý v? ? ã bi?n m?t.  4) Tránh cafêin, r? ?u và các th? c ? n có briseyda v? có th? kích thích th?n và bàng neal.  5) Quý v? có th? dùng acetaminophen (Tylenol) ho? c ibuprofen (Motrin, Advil) ? ? ki? m soát c?n ?au, tr ? khi ? ã ?? ?c cho dùng m?t lo?i thu?c ch? ng ?au khác. [L?U Ý: N?u quý v? b? b?nh jane ho?c th?n mãn tính ho?c t?ng b? loét fareed t? (stomach ulcer) ho?c gibson?t young? t ?? ?ng tiêu hóa (GI bleeding), hãy bàn v?i bác s? c?a quý v? tr? ?c khi dùng các lo?i thu?c này.]  Ti?p T?c Thiago Dõi  v?i bác s? c?a quý v? ho?c thiago s? h? ?ng d?n c?a nhân viên " cindi tôi ? ? tái xét hilda? m n? ?c ti?u sudeep 10 ngày. ? i?u này s? b?o ? ?m là s? bala?m trùng c?a quý v? ? ã d?t virgenàn toleonel.  [L?U Ý: N?u quý v? ? ã ch?p neal bjorn?n ho?c ch?p CT, bác s? chuyên edith s? xem l?i vi?c này. Quý v? s? ?? ?c thông báo v? b?t k? khám phá m?i nào có th? ? nh h? ? ng ? ?n vi? c ch?m sóc c ?a quý v?.]  N?u x?y ra b?t c? ?i?u nào gen ?ây hãy L?P T?C ?I?U TR? Y T?:  -- S?t trên 100.4  F (38.0  C) gen 48 gi? ?i ?u tr?  -- Không ? ? h?n vào ngày th ? ba ?? ? c ?i ?u tr?  -- ?au l?ng ho?c ?au b?ng brsieyda t?ng  -- Ói m?a bala?u l?n ho?c không th? u?ng thu?c  -- Emily y?u, chóng m?t ho?c ng?t x?u  Date Last Reviewed: 2/5/2016 2000-2017 [employer]. [address] [place] [address], [place] [id number]. All rights reserved. This information is not intended as a substitute for professional medical care. Always follow your healthcare professional's instructions.

## 2017-12-14 ENCOUNTER — TELEPHONE (OUTPATIENT)
Dept: URGENT CARE | Facility: URGENT CARE | Age: 42
End: 2017-12-14

## 2017-12-14 LAB
BACTERIA SPEC CULT: ABNORMAL
SPECIMEN SOURCE: ABNORMAL

## 2017-12-14 NOTE — TELEPHONE ENCOUNTER
I notified her of her culture results,pt stated  going to [facility] for treatment,She was unable to explain any further information due to language barrier so she said to contact her boyfriend ([phone]). Pt boyfriend states they went the day after the UC visit due to not feeling better and the ED doctor started her on IV fluids. Mary Duque MA

## 2017-12-20 ENCOUNTER — OFFICE VISIT (OUTPATIENT)
Dept: INTERNAL MEDICINE | Facility: CLINIC | Age: 42
End: 2017-12-20
Payer: COMMERCIAL

## 2017-12-20 VITALS
HEART RATE: 96 BPM | WEIGHT: 105 LBS | DIASTOLIC BLOOD PRESSURE: 68 MMHG | SYSTOLIC BLOOD PRESSURE: 108 MMHG | TEMPERATURE: 97.4 F | BODY MASS INDEX: 24.4 KG/M2 | OXYGEN SATURATION: 99 % | RESPIRATION RATE: 16 BRPM

## 2017-12-20 DIAGNOSIS — Z16.12 UTI DUE TO EXTENDED-SPECTRUM BETA LACTAMASE (ESBL) PRODUCING ESCHERICHIA COLI: ICD-10-CM

## 2017-12-20 DIAGNOSIS — N10 ACUTE PYELONEPHRITIS: Primary | ICD-10-CM

## 2017-12-20 DIAGNOSIS — B96.29 UTI DUE TO EXTENDED-SPECTRUM BETA LACTAMASE (ESBL) PRODUCING ESCHERICHIA COLI: ICD-10-CM

## 2017-12-20 DIAGNOSIS — N39.0 RECURRENT UTI: ICD-10-CM

## 2017-12-20 DIAGNOSIS — N39.0 UTI DUE TO EXTENDED-SPECTRUM BETA LACTAMASE (ESBL) PRODUCING ESCHERICHIA COLI: ICD-10-CM

## 2017-12-20 LAB
ALBUMIN UR-MCNC: NEGATIVE MG/DL
APPEARANCE UR: CLEAR
BACTERIA #/AREA URNS HPF: ABNORMAL /HPF
BILIRUB UR QL STRIP: NEGATIVE
COLOR UR AUTO: YELLOW
GLUCOSE UR STRIP-MCNC: NEGATIVE MG/DL
HGB UR QL STRIP: ABNORMAL
KETONES UR STRIP-MCNC: NEGATIVE MG/DL
LEUKOCYTE ESTERASE UR QL STRIP: NEGATIVE
NITRATE UR QL: NEGATIVE
NON-SQ EPI CELLS #/AREA URNS LPF: ABNORMAL /LPF
PH UR STRIP: 5.5 PH (ref 5–7)
RBC #/AREA URNS AUTO: ABNORMAL /HPF
SOURCE: ABNORMAL
SP GR UR STRIP: 1.02 (ref 1–1.03)
UROBILINOGEN UR STRIP-ACNC: 0.2 EU/DL (ref 0.2–1)
WBC #/AREA URNS AUTO: ABNORMAL /HPF

## 2017-12-20 PROCEDURE — 81001 URINALYSIS AUTO W/SCOPE: CPT | Performed by: INTERNAL MEDICINE

## 2017-12-20 PROCEDURE — 87086 URINE CULTURE/COLONY COUNT: CPT | Performed by: INTERNAL MEDICINE

## 2017-12-20 PROCEDURE — 99213 OFFICE O/P EST LOW 20 MIN: CPT | Performed by: INTERNAL MEDICINE

## 2017-12-20 NOTE — LETTER
12/21/17      Antoinette Magy Jeffrey  [address] [facility] [address]        Dear Ms.Jeffrey,    It was a pleasure seeing you again the other day! I hope this finds you well.    I wanted to let you know that your urine came back as negative for infection.    Please feel free to contact me with any questions or concerns.      Take care,    Sofiya Ulloa MD   [address] [address]  T: [phone], F: [phone]

## 2017-12-20 NOTE — MR AVS SNAPSHOT
After Visit Summary   12/20/2017    Antoinette Murphy    MRN: [id number]           Patient Information     Date Of Birth          1975        Visit Information        Provider Department      12/20/2017 8:30 AM Sofiya Ulloa MD; [employer] [facility]        Today's Diagnoses     UTI due to extended-spectrum beta lactamase (ESBL) producing Escherichia coli    -  1    Recurrent UTI          Care Instructions    Urine sample today.    ---    Please schedule follow-up with infectious disease - number below.           Follow-ups after your visit        Additional Services     INFECTIOUS DISEASE REFERRAL       Your provider has referred you to: [facility]: [employer] [facility] [place] [phone]   [email or web address]    Please be aware that coverage of these services is subject to the terms and limitations of your health insurance plan.  Call member services at your health plan with any benefit or coverage questions.      Please bring the following with you to your appointment:    (1) Any X-Rays, CTs or MRIs which have been performed.  Contact the facility where they were done to arrange for  prior to your scheduled appointment.    (2) List of current medications   (3) This referral request   (4) Any documents/labs given to you for this referral                  Who to contact     If you have questions or need follow up information about today's clinic visit or your schedule please contact [facility] directly at [phone].  Normal or non-critical lab and imaging results will be communicated to you by MyChart, letter or phone within 4 business days after the clinic has received the results. If you do not hear from us within 7 days, please contact the clinic through MyChart or phone. If you have a critical or abnormal lab result, we will notify you by phone as soon as possible.  Submit refill requests  "through [employer] or call your pharmacy and they will forward the refill request to us. Please allow 3 business days for your refill to be completed.          Additional Information About Your Visit        [email or web address] Information     [employer] lets you send messages to your doctor, view your test results, renew your prescriptions, schedule appointments and more. To sign up, go to [email or web address] . Click on \"Log in\" on the left side of the screen, which will take you to the Welcome page. Then click on \"Sign up Now\" on the right side of the page.     You will be asked to enter the access code listed below, as well as some personal information. Please follow the directions to create your username and password.     Your access code is: [email or web address]  Expires: 2017  3:38 PM     Your access code will  in 90 days. If you need help or a new code, please call your [facility] clinic or [phone].        Care EveryWhere ID     This is your Care EveryWhere ID. This could be used by other organizations to access your [facility] medical records  [id number]        Your Vitals Were     Pulse Temperature Respirations Last Period Pulse Oximetry BMI (Body Mass Index)    96 97.4  F (36.3  C) (Oral) 16 2017 (Approximate) 99% 24.4 kg/m2       Blood Pressure from Last 3 Encounters:   17 108/68   17 126/79   17 130/80    Weight from Last 3 Encounters:   17 105 lb (47.6 kg)   17 107 lb (48.5 kg)   17 107 lb (48.5 kg)              We Performed the Following     INFECTIOUS DISEASE REFERRAL     UA with Microscopic     Urine Culture Aerobic Bacterial        Primary Care Provider Fax #    Provider Not In System [phone]                Equal Access to Services     DAMI GAR : Art Wynn, guicho neville, obed pedersen. So [facility] [phone].    ATENCIÓN: Si habla español, tiene a johnson disposición servicios gratuitos " de asistencia lingüística. Aarti nelson [phone].    We comply with applicable federal civil rights laws and [place] laws. We do not discriminate on the basis of race, color, national origin, age, disability, sex, sexual orientation, or gender identity.            Thank you!     Thank you for choosing [facility]  for your care. Our goal is always to provide you with excellent care. Hearing back from our patients is one way we can continue to improve our services. Please take a few minutes to complete the written survey that you may receive in the mail after your visit with us. Thank you!             Your Updated Medication List - Protect others around you: Learn how to safely use, store and throw away your medicines at www.disposemymeds.org.          This list is accurate as of: 12/20/17  9:04 AM.  Always use your most recent med list.                   Brand Name Dispense Instructions for use Diagnosis    ibuprofen 600 MG tablet    ADVIL/MOTRIN    20 tablet    Take 1 tablet (600 mg) by mouth every 6 hours as needed for moderate pain

## 2017-12-20 NOTE — NURSING NOTE
"Chief Complaint   Patient presents with     Hypertension     Hospital F/U       Initial /68  Pulse 96  Temp 97.4  F (36.3  C) (Oral)  Resp 16  Wt 105 lb (47.6 kg)  LMP 12/05/2017 (Approximate)  SpO2 99%  BMI 24.4 kg/m2 Estimated body mass index is 24.4 kg/(m^2) as calculated from the following:    Height as of 12/12/17: 4' 7\" (1.397 m).    Weight as of this encounter: 105 lb (47.6 kg).  Medication Reconciliation: complete   Padma Goins MA      "

## 2017-12-20 NOTE — PROGRESS NOTES
SUBJECTIVE:                                                      HPI: Antoinette Murphy is a pleasant 42 year old female who presents for ER follow-up of acute pyelonephritis:    Presented to urgent care 12/11/2017 with urinary symptoms.    Started on Bactrim, but noted worsening symptoms and presented to the ER the next day.    Patient was tachycardic and febrile in the ER with left CVA tenderness on exam.    She had an elevated white count (15.9) with neutrophil predominance (12.8%).  Bactrim was stopped and Levaquin 500 mg daily × 7 days was started.     Patient reports that her urinary symptoms have now resolved.    She also denies fevers, chills, abdominal, pelvic, or flank pain.    She feels well today - no complaints.    PMH significant sepsis due to ESBL producing E. coli in June of this year.    The medication, allergy, and problem lists have been reviewed and updated as appropriate.       OBJECTIVE:                                                      /68  Pulse 96  Temp 97.4  F (36.3  C) (Oral)  Resp 16  Wt 105 lb (47.6 kg)  LMP 12/05/2017 (Approximate)  SpO2 99%  BMI 24.4 kg/m2  Constitutional: well-appearing  Psych: normal judgment and insight; normal mood and affect; recent and remote memory intact      ASSESSMENT/PLAN:                                                      (N10) Acute pyelonephritis  (primary encounter diagnosis)  (N39.0,  A49.8,  Z16.12) UTI due to extended-spectrum beta lactamase (ESBL) producing Escherichia coli  (N39.0) Recurrent UTI  Comment:    - patient is doing clinically well now.   - recurrent episodes of ESBL acute pyelonephritis are concerning, however  Plan:    - UA micro and culture today to ensure resolution of infection.    - referred to ID for further evaluation - patient to schedule.    The instructions on the AVS were discussed and explained to the patient. Patient expressed understanding of instructions.    (Chart documentation was completed, in part,  with Dragon voice-recognition software. Even though reviewed, some grammatical, spelling, and word errors may remain.)    Sofiya Ulloa MD   [address] [address]  T: [phone], F: [phone]

## 2017-12-21 LAB
BACTERIA SPEC CULT: NO GROWTH
SPECIMEN SOURCE: NORMAL

## 2019-08-13 NOTE — ED NOTES
Bed: ED19  Expected date:   Expected time:   Means of arrival:   Comments:  Triage-acosta   Dr. William Casiano for cataract surgery.